# Patient Record
Sex: FEMALE | Race: WHITE | NOT HISPANIC OR LATINO
[De-identification: names, ages, dates, MRNs, and addresses within clinical notes are randomized per-mention and may not be internally consistent; named-entity substitution may affect disease eponyms.]

---

## 2017-09-29 ENCOUNTER — APPOINTMENT (OUTPATIENT)
Dept: DERMATOLOGY | Facility: CLINIC | Age: 47
End: 2017-09-29
Payer: COMMERCIAL

## 2017-09-29 VITALS — DIASTOLIC BLOOD PRESSURE: 70 MMHG | SYSTOLIC BLOOD PRESSURE: 102 MMHG

## 2017-09-29 DIAGNOSIS — L68.9 HYPERTRICHOSIS, UNSPECIFIED: ICD-10-CM

## 2017-09-29 PROCEDURE — 17000 DESTRUCT PREMALG LESION: CPT

## 2017-09-29 PROCEDURE — 99214 OFFICE O/P EST MOD 30 MIN: CPT | Mod: 25

## 2018-10-19 ENCOUNTER — APPOINTMENT (OUTPATIENT)
Dept: DERMATOLOGY | Facility: CLINIC | Age: 48
End: 2018-10-19
Payer: COMMERCIAL

## 2018-10-19 ENCOUNTER — LABORATORY RESULT (OUTPATIENT)
Age: 48
End: 2018-10-19

## 2018-10-19 VITALS — SYSTOLIC BLOOD PRESSURE: 110 MMHG | DIASTOLIC BLOOD PRESSURE: 72 MMHG

## 2018-10-19 DIAGNOSIS — L30.9 DERMATITIS, UNSPECIFIED: ICD-10-CM

## 2018-10-19 DIAGNOSIS — D49.2 NEOPLASM OF UNSPECIFIED BEHAVIOR OF BONE, SOFT TISSUE, AND SKIN: ICD-10-CM

## 2018-10-19 PROCEDURE — 11100 BX SKIN SUBCUTANEOUS&/MUCOUS MEMBRANE 1 LESION: CPT | Mod: 59,GC

## 2018-10-19 PROCEDURE — 17000 DESTRUCT PREMALG LESION: CPT | Mod: GC,59

## 2018-10-19 PROCEDURE — 99214 OFFICE O/P EST MOD 30 MIN: CPT | Mod: 25,GC

## 2018-12-14 ENCOUNTER — LABORATORY RESULT (OUTPATIENT)
Age: 48
End: 2018-12-14

## 2018-12-14 ENCOUNTER — APPOINTMENT (OUTPATIENT)
Dept: DERMATOLOGY | Facility: CLINIC | Age: 48
End: 2018-12-14
Payer: COMMERCIAL

## 2018-12-14 VITALS — SYSTOLIC BLOOD PRESSURE: 100 MMHG | DIASTOLIC BLOOD PRESSURE: 70 MMHG

## 2018-12-14 PROCEDURE — 99213 OFFICE O/P EST LOW 20 MIN: CPT | Mod: 25,GC

## 2018-12-14 PROCEDURE — 17000 DESTRUCT PREMALG LESION: CPT | Mod: GC

## 2018-12-14 PROCEDURE — 11100 BX SKIN SUBCUTANEOUS&/MUCOUS MEMBRANE 1 LESION: CPT | Mod: 59,GC

## 2018-12-20 ENCOUNTER — RESULT REVIEW (OUTPATIENT)
Age: 48
End: 2018-12-20

## 2019-01-29 ENCOUNTER — APPOINTMENT (OUTPATIENT)
Dept: MRI IMAGING | Facility: CLINIC | Age: 49
End: 2019-01-29
Payer: COMMERCIAL

## 2019-01-29 ENCOUNTER — OUTPATIENT (OUTPATIENT)
Dept: OUTPATIENT SERVICES | Facility: HOSPITAL | Age: 49
LOS: 1 days | End: 2019-01-29
Payer: COMMERCIAL

## 2019-01-29 DIAGNOSIS — Z00.8 ENCOUNTER FOR OTHER GENERAL EXAMINATION: ICD-10-CM

## 2019-01-29 PROCEDURE — 74183 MRI ABD W/O CNTR FLWD CNTR: CPT

## 2019-01-29 PROCEDURE — A9585: CPT

## 2019-01-29 PROCEDURE — 74183 MRI ABD W/O CNTR FLWD CNTR: CPT | Mod: 26

## 2019-03-06 ENCOUNTER — APPOINTMENT (OUTPATIENT)
Dept: DERMATOLOGY | Facility: CLINIC | Age: 49
End: 2019-03-06
Payer: COMMERCIAL

## 2019-03-06 PROCEDURE — 99214 OFFICE O/P EST MOD 30 MIN: CPT

## 2019-03-19 ENCOUNTER — APPOINTMENT (OUTPATIENT)
Dept: DERMATOLOGY | Facility: CLINIC | Age: 49
End: 2019-03-19
Payer: COMMERCIAL

## 2019-03-19 DIAGNOSIS — C44.719 BASAL CELL CARCINOMA OF SKIN OF LEFT LOWER LIMB, INCLUDING HIP: ICD-10-CM

## 2019-03-19 PROCEDURE — 17313 MOHS 1 STAGE T/A/L: CPT

## 2019-05-13 ENCOUNTER — APPOINTMENT (OUTPATIENT)
Dept: ULTRASOUND IMAGING | Facility: IMAGING CENTER | Age: 49
End: 2019-05-13

## 2019-05-21 ENCOUNTER — APPOINTMENT (OUTPATIENT)
Dept: DERMATOLOGY | Facility: CLINIC | Age: 49
End: 2019-05-21

## 2019-10-24 ENCOUNTER — RESULT REVIEW (OUTPATIENT)
Age: 49
End: 2019-10-24

## 2019-10-29 ENCOUNTER — APPOINTMENT (OUTPATIENT)
Dept: MAMMOGRAPHY | Facility: CLINIC | Age: 49
End: 2019-10-29
Payer: COMMERCIAL

## 2019-10-29 ENCOUNTER — APPOINTMENT (OUTPATIENT)
Dept: ULTRASOUND IMAGING | Facility: CLINIC | Age: 49
End: 2019-10-29
Payer: COMMERCIAL

## 2019-10-29 ENCOUNTER — OUTPATIENT (OUTPATIENT)
Dept: OUTPATIENT SERVICES | Facility: HOSPITAL | Age: 49
LOS: 1 days | End: 2019-10-29
Payer: COMMERCIAL

## 2019-10-29 DIAGNOSIS — Z00.8 ENCOUNTER FOR OTHER GENERAL EXAMINATION: ICD-10-CM

## 2019-10-29 PROCEDURE — 77067 SCR MAMMO BI INCL CAD: CPT | Mod: 26

## 2019-10-29 PROCEDURE — 76641 ULTRASOUND BREAST COMPLETE: CPT

## 2019-10-29 PROCEDURE — 77063 BREAST TOMOSYNTHESIS BI: CPT

## 2019-10-29 PROCEDURE — 76641 ULTRASOUND BREAST COMPLETE: CPT | Mod: 26,50

## 2019-10-29 PROCEDURE — 77067 SCR MAMMO BI INCL CAD: CPT

## 2019-10-29 PROCEDURE — 77063 BREAST TOMOSYNTHESIS BI: CPT | Mod: 26

## 2019-12-30 ENCOUNTER — APPOINTMENT (OUTPATIENT)
Dept: DERMATOLOGY | Facility: CLINIC | Age: 49
End: 2019-12-30

## 2020-01-23 ENCOUNTER — APPOINTMENT (OUTPATIENT)
Dept: DERMATOLOGY | Facility: CLINIC | Age: 50
End: 2020-01-23
Payer: COMMERCIAL

## 2020-01-23 ENCOUNTER — LABORATORY RESULT (OUTPATIENT)
Age: 50
End: 2020-01-23

## 2020-01-23 PROCEDURE — 11102 TANGNTL BX SKIN SINGLE LES: CPT | Mod: 59

## 2020-01-23 PROCEDURE — 99214 OFFICE O/P EST MOD 30 MIN: CPT | Mod: 25

## 2020-01-23 PROCEDURE — 17003 DESTRUCT PREMALG LES 2-14: CPT | Mod: 59

## 2020-01-23 PROCEDURE — 17000 DESTRUCT PREMALG LESION: CPT | Mod: 59

## 2020-03-04 ENCOUNTER — APPOINTMENT (OUTPATIENT)
Dept: DERMATOLOGY | Facility: CLINIC | Age: 50
End: 2020-03-04
Payer: COMMERCIAL

## 2020-03-04 PROCEDURE — 17312 MOHS ADDL STAGE: CPT

## 2020-03-04 PROCEDURE — 17311 MOHS 1 STAGE H/N/HF/G: CPT

## 2020-03-04 PROCEDURE — 13132 CMPLX RPR F/C/C/M/N/AX/G/H/F: CPT

## 2020-04-25 ENCOUNTER — MESSAGE (OUTPATIENT)
Age: 50
End: 2020-04-25

## 2020-05-05 ENCOUNTER — APPOINTMENT (OUTPATIENT)
Dept: DISASTER EMERGENCY | Facility: HOSPITAL | Age: 50
End: 2020-05-05

## 2020-05-05 LAB
SARS-COV-2 IGG SERPL IA-ACNC: <0.1 INDEX
SARS-COV-2 IGG SERPL QL IA: NEGATIVE

## 2020-10-30 ENCOUNTER — RESULT REVIEW (OUTPATIENT)
Age: 50
End: 2020-10-30

## 2020-11-20 ENCOUNTER — APPOINTMENT (OUTPATIENT)
Dept: DERMATOLOGY | Facility: CLINIC | Age: 50
End: 2020-11-20
Payer: COMMERCIAL

## 2020-11-20 ENCOUNTER — LABORATORY RESULT (OUTPATIENT)
Age: 50
End: 2020-11-20

## 2020-11-20 VITALS — BODY MASS INDEX: 22.08 KG/M2 | HEIGHT: 62 IN | WEIGHT: 120 LBS

## 2020-11-20 DIAGNOSIS — D18.01 HEMANGIOMA OF SKIN AND SUBCUTANEOUS TISSUE: ICD-10-CM

## 2020-11-20 DIAGNOSIS — Z85.828 PERSONAL HISTORY OF OTHER MALIGNANT NEOPLASM OF SKIN: ICD-10-CM

## 2020-11-20 PROCEDURE — 11102 TANGNTL BX SKIN SINGLE LES: CPT | Mod: 59

## 2020-11-20 PROCEDURE — 17000 DESTRUCT PREMALG LESION: CPT | Mod: 59

## 2020-11-20 PROCEDURE — 99213 OFFICE O/P EST LOW 20 MIN: CPT | Mod: 25

## 2020-11-20 PROCEDURE — 17003 DESTRUCT PREMALG LES 2-14: CPT | Mod: 59

## 2020-11-27 ENCOUNTER — NON-APPOINTMENT (OUTPATIENT)
Age: 50
End: 2020-11-27

## 2020-12-11 ENCOUNTER — APPOINTMENT (OUTPATIENT)
Dept: ENDOCRINOLOGY | Facility: CLINIC | Age: 50
End: 2020-12-11
Payer: COMMERCIAL

## 2020-12-11 VITALS
TEMPERATURE: 98.6 F | HEIGHT: 62 IN | RESPIRATION RATE: 16 BRPM | BODY MASS INDEX: 22.82 KG/M2 | HEART RATE: 68 BPM | DIASTOLIC BLOOD PRESSURE: 62 MMHG | OXYGEN SATURATION: 99 % | WEIGHT: 124 LBS | SYSTOLIC BLOOD PRESSURE: 100 MMHG

## 2020-12-11 DIAGNOSIS — R79.89 OTHER SPECIFIED ABNORMAL FINDINGS OF BLOOD CHEMISTRY: ICD-10-CM

## 2020-12-11 PROCEDURE — 36415 COLL VENOUS BLD VENIPUNCTURE: CPT

## 2020-12-11 PROCEDURE — 82962 GLUCOSE BLOOD TEST: CPT

## 2020-12-11 PROCEDURE — 99204 OFFICE O/P NEW MOD 45 MIN: CPT | Mod: 25

## 2020-12-11 PROCEDURE — 99072 ADDL SUPL MATRL&STAF TM PHE: CPT

## 2020-12-11 PROCEDURE — 76536 US EXAM OF HEAD AND NECK: CPT

## 2020-12-11 NOTE — IMPRESSION
[FreeTextEntry1] : MNG with hx of mng with prio fna neg.  She willobtain old recoirds and will plan for f/u US.

## 2020-12-11 NOTE — PROCEDURE
[Readmill e 2008 model, 10-12 MHz frequencies] : multiple real time longitudinal and transverse images were obtained using a high resolution ultrasound with a linear transducer, Readmill e 2008 model, 10-12 MHz frequencies. All measurements will be reported as longitudinal x ninfa-posterior x transverse. [Thyroid Nodule] : thyroid nodule [] : a heterogeneous parenchyma [Mixed] : mixed [No] : does not have a halo [Right Thyroid] : right [Lower] : lower pole there is a  [Isoechoic w/ hypoechoic foci] : isoechoic, with an internal hypoechoic foci nodule [Round] : round in shape [No calcifications] : no calcifications [Peripheral vascularity] : peripheral vascularity [Left Thyroid] : left [Mid] : mid pole there is a  [Solid] : solid [Heterogeneous] : heterogenous nodule [Ovoid] : ovoid in shape [Regular] : regular [Microcalcifications] : microcalcifications [Intense internal vascularity] : intense internal vascularity [FreeTextEntry1] : 3.28 x 1.80 x 1.72 [FreeTextEntry5] : 2.95 x 1.58  x 1.90 [FreeTextEntry2] : 0.47 [FreeTextEntry3] : 1.22 x  0.78 x 0.94

## 2020-12-11 NOTE — PROCEDURE
[[employer] e 2008 model, 10-12 MHz frequencies] : multiple real time longitudinal and transverse images were obtained using a high resolution ultrasound with a linear transducer, [employer] e 2008 model, 10-12 MHz frequencies. All measurements will be reported as longitudinal x ninfa-posterior x transverse. [Thyroid Nodule] : thyroid nodule [] : a heterogeneous parenchyma [Mixed] : mixed [No] : does not have a halo [Right Thyroid] : right [Lower] : lower pole there is a  [Isoechoic w/ hypoechoic foci] : isoechoic, with an internal hypoechoic foci nodule [Round] : round in shape [No calcifications] : no calcifications [Peripheral vascularity] : peripheral vascularity [Left Thyroid] : left [Mid] : mid pole there is a  [Solid] : solid [Heterogeneous] : heterogenous nodule [Ovoid] : ovoid in shape [Regular] : regular [Microcalcifications] : microcalcifications [Intense internal vascularity] : intense internal vascularity [FreeTextEntry1] : 3.28 x 1.80 x 1.72 [FreeTextEntry5] : 2.95 x 1.58  x 1.90 [FreeTextEntry2] : 0.47 [FreeTextEntry3] : 1.22 x  0.78 x 0.94

## 2020-12-15 ENCOUNTER — NON-APPOINTMENT (OUTPATIENT)
Age: 50
End: 2020-12-15

## 2020-12-21 LAB — GLUCOSE BLDC GLUCOMTR-MCNC: 116

## 2020-12-22 DIAGNOSIS — E53.8 DEFICIENCY OF OTHER SPECIFIED B GROUP VITAMINS: ICD-10-CM

## 2020-12-22 LAB
25(OH)D3 SERPL-MCNC: 26 NG/ML
ACTH SER-ACNC: 13.8 PG/ML
ALBUMIN SERPL ELPH-MCNC: 4.6 G/DL
ALP BLD-CCNC: 51 U/L
ALT SERPL-CCNC: 15 U/L
ANION GAP SERPL CALC-SCNC: 10 MMOL/L
AST SERPL-CCNC: 19 U/L
BASOPHILS # BLD AUTO: 0.04 K/UL
BASOPHILS NFR BLD AUTO: 0.9 %
BILIRUB SERPL-MCNC: 0.3 MG/DL
BUN SERPL-MCNC: 11 MG/DL
CALCIT SERPL-MCNC: 7.7 PG/ML
CALCIUM SERPL-MCNC: 9.5 MG/DL
CHLORIDE SERPL-SCNC: 106 MMOL/L
CO2 SERPL-SCNC: 28 MMOL/L
CORTIS SERPL-MCNC: 7.7 UG/DL
CREAT SERPL-MCNC: 0.65 MG/DL
EOSINOPHIL # BLD AUTO: 0.1 K/UL
EOSINOPHIL NFR BLD AUTO: 2.3 %
ESTIMATED AVERAGE GLUCOSE: 100 MG/DL
ESTRADIOL SERPL-MCNC: 66 PG/ML
FSH SERPL-MCNC: 30.9 IU/L
GLUCOSE SERPL-MCNC: 84 MG/DL
HBA1C MFR BLD HPLC: 5.1 %
HCT VFR BLD CALC: 38.7 %
HGB BLD-MCNC: 12.4 G/DL
IGF-1 INTERP: NORMAL
IGF-I BLD-MCNC: 130 NG/ML
IMM GRANULOCYTES NFR BLD AUTO: 0.2 %
INSULIN SERPL-MCNC: 3.4 UU/ML
LH SERPL-ACNC: 34.6 IU/L
LYMPHOCYTES # BLD AUTO: 1.69 K/UL
LYMPHOCYTES NFR BLD AUTO: 38.2 %
MAN DIFF?: NORMAL
MCHC RBC-ENTMCNC: 30.8 PG
MCHC RBC-ENTMCNC: 32 GM/DL
MCV RBC AUTO: 96 FL
MONOCYTES # BLD AUTO: 0.65 K/UL
MONOCYTES NFR BLD AUTO: 14.7 %
NEUTROPHILS # BLD AUTO: 1.93 K/UL
NEUTROPHILS NFR BLD AUTO: 43.7 %
PLATELET # BLD AUTO: 205 K/UL
POTASSIUM SERPL-SCNC: 4.5 MMOL/L
PROGEST SERPL-MCNC: 0.7 NG/ML
PROLACTIN SERPL-MCNC: 11.1 NG/ML
PROT SERPL-MCNC: 6.3 G/DL
RBC # BLD: 4.03 M/UL
RBC # FLD: 11.9 %
SODIUM SERPL-SCNC: 145 MMOL/L
T3FREE SERPL-MCNC: 2.41 PG/ML
T4 FREE SERPL-MCNC: 1.1 NG/DL
THYROGLOB AB SERPL-ACNC: <20 IU/ML
THYROPEROXIDASE AB SERPL IA-ACNC: 18.2 IU/ML
TSH SERPL-ACNC: 1.26 UIU/ML
VIT B12 SERPL-MCNC: 255 PG/ML
WBC # FLD AUTO: 4.42 K/UL

## 2020-12-24 ENCOUNTER — NON-APPOINTMENT (OUTPATIENT)
Age: 50
End: 2020-12-24

## 2020-12-28 ENCOUNTER — NON-APPOINTMENT (OUTPATIENT)
Age: 50
End: 2020-12-28

## 2020-12-28 ENCOUNTER — APPOINTMENT (OUTPATIENT)
Dept: DERMATOLOGY | Facility: CLINIC | Age: 50
End: 2020-12-28
Payer: COMMERCIAL

## 2020-12-28 DIAGNOSIS — C44.712 BASAL CELL CARCINOMA OF SKIN OF RIGHT LOWER LIMB, INCLUDING HIP: ICD-10-CM

## 2020-12-28 PROBLEM — R79.89 HIGH SERUM CALCITONIN: Status: ACTIVE | Noted: 2020-12-22

## 2020-12-28 PROCEDURE — 12041 INTMD RPR N-HF/GENIT 2.5CM/<: CPT

## 2020-12-28 PROCEDURE — 17311 MOHS 1 STAGE H/N/HF/G: CPT

## 2020-12-28 PROCEDURE — 99072 ADDL SUPL MATRL&STAF TM PHE: CPT

## 2020-12-28 NOTE — HISTORY OF PRESENT ILLNESS
[FreeTextEntry1] : Ms. RUSSO  is a 50 year  year old female  who presents for initial endocrine evaluation. She presents via the courtesy of Dr. Yadira Reveles. with regard to a history of low bg noted first during her third pregnancy in 2010/ Post partum she noted significant difficult in dropping weight and continued low bg.  she was evaluated by Dr. 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  She underwent distal pancreatomy with apparent cure. Her surgeon was Dr. Alejandro Menchaca.\par Was well until 2018 when she again was gaining weight and noted low bg with exercise.  Full w/u carried out with thoughts that a recurrence may be present but may possibly have occurred outside of the pancreas. She had 2nd opinion at [facility] and ultimately had a surgical procedure that did not involve pancreatic surgery. ? removal of a node with ultimate resolution of her symptoms.\par \par She did have PEt scan showing gianluca of mng lighting up in thyroid regions.  Had fna 2019 ? biilat-she is unsure with apparent benign findings.  She too stated calcitonin and tft's were normal and MEN gene testing was said to be negative.\par . Additional medical history includes that of  .\par

## 2020-12-28 NOTE — HISTORY OF PRESENT ILLNESS
[FreeTextEntry1] : Ms. RUSSO  is a 50 year  year old female  who presents for initial endocrine evaluation. She presents via the courtesy of Dr. Yadira Reveles. with regard to a history of low bg noted first during her third pregnancy in 2010/ Post partum she noted significant difficult in dropping weight and continued low bg.  she was evaluated by Dr. 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  She underwent distal pancreatomy with apparent cure. Her surgeon was Dr. Alejandro Menchaca.\par Was well until 2018 when she again was gaining weight and noted low bg with exercise.  Full w/u carried out with thoughts that a recurrence may be present but may possibly have occurred outside of the pancreas. She had 2nd opinion at [facility] and ultimately had a surgical procedure that did not involve pancreatic surgery. ? removal of a node with ultimate resolution of her symptoms.\par \par She did have PEt scan showing gianluca of mng lighting up in thyroid regions.  Had fna 2019 ? biilat-she is unsure with apparent benign findings.  She too stated calcitonin and tft's were normal and MEN gene testing was said to be negative.\par . Additional medical history includes that of  .\par

## 2021-01-04 LAB
CORTICOSTEROID BIND GLOBULIN: 1.8 MG/DL
CORTIS SERPL-MCNC: 8.2 UG/DL
CORTISOL, FREE: 0.58 UG/DL
PFCX: 7.1 %

## 2021-01-06 ENCOUNTER — TRANSCRIPTION ENCOUNTER (OUTPATIENT)
Age: 51
End: 2021-01-06

## 2021-01-15 ENCOUNTER — APPOINTMENT (OUTPATIENT)
Dept: MAMMOGRAPHY | Facility: CLINIC | Age: 51
End: 2021-01-15
Payer: COMMERCIAL

## 2021-01-15 ENCOUNTER — APPOINTMENT (OUTPATIENT)
Dept: ULTRASOUND IMAGING | Facility: CLINIC | Age: 51
End: 2021-01-15
Payer: COMMERCIAL

## 2021-01-15 ENCOUNTER — OUTPATIENT (OUTPATIENT)
Dept: OUTPATIENT SERVICES | Facility: HOSPITAL | Age: 51
LOS: 1 days | End: 2021-01-15
Payer: COMMERCIAL

## 2021-01-15 DIAGNOSIS — Z00.8 ENCOUNTER FOR OTHER GENERAL EXAMINATION: ICD-10-CM

## 2021-01-15 DIAGNOSIS — R92.2 INCONCLUSIVE MAMMOGRAM: ICD-10-CM

## 2021-01-15 DIAGNOSIS — Z12.31 ENCOUNTER FOR SCREENING MAMMOGRAM FOR MALIGNANT NEOPLASM OF BREAST: ICD-10-CM

## 2021-01-15 PROCEDURE — 76641 ULTRASOUND BREAST COMPLETE: CPT | Mod: 26,50

## 2021-01-15 PROCEDURE — 77067 SCR MAMMO BI INCL CAD: CPT | Mod: 26

## 2021-01-15 PROCEDURE — 76641 ULTRASOUND BREAST COMPLETE: CPT

## 2021-01-15 PROCEDURE — 77067 SCR MAMMO BI INCL CAD: CPT

## 2021-01-15 PROCEDURE — 77063 BREAST TOMOSYNTHESIS BI: CPT | Mod: 26

## 2021-01-15 PROCEDURE — 77063 BREAST TOMOSYNTHESIS BI: CPT

## 2021-01-28 ENCOUNTER — APPOINTMENT (OUTPATIENT)
Dept: INTERNAL MEDICINE | Facility: CLINIC | Age: 51
End: 2021-01-28
Payer: COMMERCIAL

## 2021-01-28 VITALS
DIASTOLIC BLOOD PRESSURE: 78 MMHG | BODY MASS INDEX: 21.53 KG/M2 | TEMPERATURE: 98.4 F | HEART RATE: 70 BPM | HEIGHT: 62 IN | OXYGEN SATURATION: 97 % | WEIGHT: 117 LBS | SYSTOLIC BLOOD PRESSURE: 113 MMHG

## 2021-01-28 DIAGNOSIS — M77.9 ENTHESOPATHY, UNSPECIFIED: ICD-10-CM

## 2021-01-28 DIAGNOSIS — Z00.00 ENCOUNTER FOR GENERAL ADULT MEDICAL EXAMINATION W/OUT ABNORMAL FINDINGS: ICD-10-CM

## 2021-01-28 PROCEDURE — 99386 PREV VISIT NEW AGE 40-64: CPT

## 2021-01-28 PROCEDURE — 99072 ADDL SUPL MATRL&STAF TM PHE: CPT

## 2021-01-28 NOTE — PLAN
[FreeTextEntry1] : HCM\par -recent labs reviewed\par -flu shot 10/20\par -completed 2 doses of COVID vaccine, tolerated well\par -mammogram 1/21\par -pap smear UTD\par -will schedule GI visit with Dr Chao for screening colonoscopy\par \par Thyroid nodules, h/o insulinoma\par -repeat calcitonin level to be checked tomorrow\par -add on CEA, endocrine from [place] request\par -continue care per endo\par \par Wrist pain likely tendonitis- ice, NSAIDs as needed, discussed seeing sports for possible steroid if worsens/no improvement

## 2021-01-28 NOTE — HEALTH RISK ASSESSMENT
[Yes] : Yes [No] : In the past 12 months have you used drugs other than those required for medical reasons? No [Patient reported mammogram was normal] : Patient reported mammogram was normal [] : No [MammogramDate] : 1/21

## 2021-01-28 NOTE — PHYSICAL EXAM
[No Acute Distress] : no acute distress [Well Nourished] : well nourished [Well Developed] : well developed [Normal Sclera/Conjunctiva] : normal sclera/conjunctiva [PERRL] : pupils equal round and reactive to light [Normal Outer Ear/Nose] : the outer ears and nose were normal in appearance [No Respiratory Distress] : no respiratory distress  [No Accessory Muscle Use] : no accessory muscle use [Clear to Auscultation] : lungs were clear to auscultation bilaterally [Normal Rate] : normal rate  [Regular Rhythm] : with a regular rhythm [Normal S1, S2] : normal S1 and S2 [No Edema] : there was no peripheral edema [Soft] : abdomen soft [Non Tender] : non-tender [Non-distended] : non-distended [Normal Bowel Sounds] : normal bowel sounds [No CVA Tenderness] : no CVA  tenderness [Grossly Normal Strength/Tone] : grossly normal strength/tone [Coordination Grossly Intact] : coordination grossly intact [No Focal Deficits] : no focal deficits [Normal Gait] : normal gait [Normal Affect] : the affect was normal [Alert and Oriented x3] : oriented to person, place, and time [Normal Insight/Judgement] : insight and judgment were intact [de-identified] : incision scar noted

## 2021-01-28 NOTE — HISTORY OF PRESENT ILLNESS
[FreeTextEntry1] : establish care [de-identified] : 51yo F with PMH of insulinoma and thyroid nodules presents to establish care.\par In 2010 during pregnancy she was found to have hypoglycemia, had central pancreatectomy and was stable after then. Then in January 2019 she became symptomatic again, was getting hypoglycemic so she went back to endocrinologist. She had an endoscopic US with subsequent lymph node dissection but did not require further surgery on pancreas. During that workup her thyroid showed up on PET scan, she then had thyroid biopsy which was benign and advised annual follow up. She recently established with Dr Roberts in December, had another thyroid US done at [place] and advised that nodules were similar. Her calcitonin level was found to be slightly elevated so she has repeat test for tomorrow. \par She is otherwise well without acute concerns. Notes pain in her right wrist with a twisting motion, never injured it that she can think of. Also has occasional left sided hip pain, no trauma to the area and no radiation of pain.

## 2021-01-30 LAB — CEA SERPL-MCNC: 1.1 NG/ML

## 2021-02-05 ENCOUNTER — NON-APPOINTMENT (OUTPATIENT)
Age: 51
End: 2021-02-05

## 2021-02-05 LAB
AMYLASE/CREAT SERPL: 56 U/L
CALCIT SERPL-MCNC: 4.4 PG/ML
LPL SERPL-CCNC: 24 U/L

## 2021-02-07 ENCOUNTER — TRANSCRIPTION ENCOUNTER (OUTPATIENT)
Age: 51
End: 2021-02-07

## 2021-02-09 ENCOUNTER — NON-APPOINTMENT (OUTPATIENT)
Age: 51
End: 2021-02-09

## 2021-04-06 ENCOUNTER — APPOINTMENT (OUTPATIENT)
Dept: ENDOCRINOLOGY | Facility: CLINIC | Age: 51
End: 2021-04-06
Payer: COMMERCIAL

## 2021-04-06 VITALS
WEIGHT: 122 LBS | HEART RATE: 66 BPM | DIASTOLIC BLOOD PRESSURE: 80 MMHG | OXYGEN SATURATION: 99 % | BODY MASS INDEX: 22.31 KG/M2 | RESPIRATION RATE: 16 BRPM | SYSTOLIC BLOOD PRESSURE: 118 MMHG

## 2021-04-06 DIAGNOSIS — R79.89 OTHER SPECIFIED ABNORMAL FINDINGS OF BLOOD CHEMISTRY: ICD-10-CM

## 2021-04-06 DIAGNOSIS — E04.1 NONTOXIC SINGLE THYROID NODULE: ICD-10-CM

## 2021-04-06 DIAGNOSIS — E55.9 VITAMIN D DEFICIENCY, UNSPECIFIED: ICD-10-CM

## 2021-04-06 DIAGNOSIS — E53.8 DEFICIENCY OF OTHER SPECIFIED B GROUP VITAMINS: ICD-10-CM

## 2021-04-06 PROCEDURE — 99214 OFFICE O/P EST MOD 30 MIN: CPT

## 2021-04-06 PROCEDURE — 99072 ADDL SUPL MATRL&STAF TM PHE: CPT

## 2021-04-22 PROBLEM — R79.89 LOW VITAMIN D LEVEL: Status: ACTIVE | Noted: 2020-12-22

## 2021-04-22 PROBLEM — E04.1 NODULAR THYROID DISEASE: Status: ACTIVE | Noted: 2020-12-11

## 2021-04-22 NOTE — HISTORY OF PRESENT ILLNESS
[FreeTextEntry1] : Ms. RUSSO  is a 50 year  old female  who presents for f/u eval.   .Too, recent US from [place] was said ot show nodularity that was  similar in size to the nodule from 2019 at which time  fna-showed benign findings. US here from 12/11/2020 showed mng with two sub cm nodules and a 1.22 cm left mid pole non high risk nodule noted. Is on d3 50,000 IU weekly and b-12 sc qod. VIT C.\par Ran out of her D3 about 3 weeks ago.\par  Se too has hx of  low bg noted first during her third pregnancy in 2010/ Post partum she noted significant difficult in dropping weight and continued low bg.  she was evaluated by Dr. 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  She underwent distal pancreatomy with apparent cure. Her surgeon was Dr. Alejandro Menchaca.\par Was well until 2018 when she again was gaining weight and noted low bg with exercise.  Full w/u carried out with thoughts that a recurrence may be present but may possibly have occurred outside of the pancreas. She had 2nd opinion at [facility] and ultimately had a surgical procedure that did not involve pancreatic surgery. ? removal of a node with ultimate resolution of her symptoms.\par \par She did have PEt scan showing gianluca of mng lighting up in thyroid regions.  Had fna 2019 ? biiat-she is unsure with apparent benign findings.  She too stated calcitonin and tft's were normal and MEN gene testing was said to be negative.\par \par Our lab testing in Dec/Jose was normal with an initial slightly elevated calcitonin and repeat wnl.\par No recent low bg episodes.No recent weight changes.\par

## 2021-04-22 NOTE — PHYSICAL EXAM
[Alert] : alert [Well Nourished] : well nourished [No Acute Distress] : no acute distress [Well Developed] : well developed [Normal Sclera/Conjunctiva] : normal sclera/conjunctiva [EOMI] : extra ocular movement intact [No Proptosis] : no proptosis [Normal Oropharynx] : the oropharynx was normal [Thyroid Not Enlarged] : the thyroid was not enlarged [No Thyroid Nodules] : no palpable thyroid nodules [No Respiratory Distress] : no respiratory distress [No Accessory Muscle Use] : no accessory muscle use [Clear to Auscultation] : lungs were clear to auscultation bilaterally [Normal S1, S2] : normal S1 and S2 [Normal Rate] : heart rate was normal [Regular Rhythm] : with a regular rhythm [No Edema] : no peripheral edema [Pedal Pulses Normal] : the pedal pulses are present [Normal Bowel Sounds] : normal bowel sounds [Not Tender] : non-tender [Not Distended] : not distended [Soft] : abdomen soft [Normal Posterior Cervical Nodes] : no posterior cervical lymphadenopathy [Normal Anterior Cervical Nodes] : no anterior cervical lymphadenopathy [No Spinal Tenderness] : no spinal tenderness [Spine Straight] : spine straight [No Stigmata of Cushings Syndrome] : no stigmata of Cushings Syndrome [Normal Gait] : normal gait [Normal Strength/Tone] : muscle strength and tone were normal [No Rash] : no rash [Acanthosis Nigricans] : no acanthosis nigricans [Normal Reflexes] : deep tendon reflexes were 2+ and symmetric [No Tremors] : no tremors [Oriented x3] : oriented to person, place, and time

## 2021-05-12 ENCOUNTER — APPOINTMENT (OUTPATIENT)
Dept: DERMATOLOGY | Facility: CLINIC | Age: 51
End: 2021-05-12
Payer: COMMERCIAL

## 2021-05-12 PROCEDURE — 99213 OFFICE O/P EST LOW 20 MIN: CPT | Mod: 25

## 2021-05-12 PROCEDURE — 99072 ADDL SUPL MATRL&STAF TM PHE: CPT

## 2021-05-12 PROCEDURE — 17000 DESTRUCT PREMALG LESION: CPT

## 2021-06-10 LAB
25(OH)D3 SERPL-MCNC: 72.4 NG/ML
ALBUMIN SERPL ELPH-MCNC: 4.8 G/DL
ALP BLD-CCNC: 54 U/L
ALT SERPL-CCNC: 9 U/L
ANION GAP SERPL CALC-SCNC: 19 MMOL/L
AST SERPL-CCNC: 18 U/L
BILIRUB SERPL-MCNC: 0.4 MG/DL
BUN SERPL-MCNC: 9 MG/DL
CALCIUM SERPL-MCNC: 9.9 MG/DL
CHLORIDE SERPL-SCNC: 98 MMOL/L
CHOLEST SERPL-MCNC: 217 MG/DL
CO2 SERPL-SCNC: 20 MMOL/L
CREAT SERPL-MCNC: 0.59 MG/DL
ESTIMATED AVERAGE GLUCOSE: 105 MG/DL
FERRITIN SERPL-MCNC: 132 NG/ML
GLUCOSE SERPL-MCNC: 100 MG/DL
HBA1C MFR BLD HPLC: 5.3 %
HDLC SERPL-MCNC: 88 MG/DL
IRON SATN MFR SERPL: 16 %
IRON SERPL-MCNC: 41 UG/DL
LDLC SERPL CALC-MCNC: 111 MG/DL
NONHDLC SERPL-MCNC: 129 MG/DL
POTASSIUM SERPL-SCNC: 4.1 MMOL/L
PROT SERPL-MCNC: 7.2 G/DL
SODIUM SERPL-SCNC: 137 MMOL/L
T3FREE SERPL-MCNC: 3.1 PG/ML
T4 FREE SERPL-MCNC: 1.6 NG/DL
THYROGLOB AB SERPL-ACNC: <20 IU/ML
THYROPEROXIDASE AB SERPL IA-ACNC: <10 IU/ML
TIBC SERPL-MCNC: 258 UG/DL
TRIGL SERPL-MCNC: 89 MG/DL
TSH SERPL-ACNC: 0.72 UIU/ML
UIBC SERPL-MCNC: 217 UG/DL
VIT B12 SERPL-MCNC: 923 PG/ML

## 2021-06-10 RX ORDER — CHOLECALCIFEROL (VITAMIN D3) 1250 MCG
1.25 MG CAPSULE ORAL
Refills: 0 | Status: ACTIVE | COMMUNITY
Start: 2020-12-22

## 2021-06-15 ENCOUNTER — NON-APPOINTMENT (OUTPATIENT)
Age: 51
End: 2021-06-15

## 2021-06-15 ENCOUNTER — APPOINTMENT (OUTPATIENT)
Dept: INTERNAL MEDICINE | Facility: CLINIC | Age: 51
End: 2021-06-15
Payer: COMMERCIAL

## 2021-06-15 VITALS
BODY MASS INDEX: 20.24 KG/M2 | WEIGHT: 110 LBS | DIASTOLIC BLOOD PRESSURE: 80 MMHG | HEART RATE: 86 BPM | SYSTOLIC BLOOD PRESSURE: 134 MMHG | TEMPERATURE: 98 F | OXYGEN SATURATION: 98 % | HEIGHT: 62 IN

## 2021-06-15 DIAGNOSIS — J34.89 OTHER SPECIFIED DISORDERS OF NOSE AND NASAL SINUSES: ICD-10-CM

## 2021-06-15 PROCEDURE — 99072 ADDL SUPL MATRL&STAF TM PHE: CPT

## 2021-06-15 PROCEDURE — 99214 OFFICE O/P EST MOD 30 MIN: CPT

## 2021-06-15 NOTE — HISTORY OF PRESENT ILLNESS
[FreeTextEntry8] : Patient feels unwell since Saturday. She has been experiencing headaches/sinus pressure, chills. She has clear nasal discharge, irritated throat. Denies PND, cough, fevers, CP, SOB. She is fully vaccinated against COVID. \par \par She also complains of significant anxiety over the past month. She has been going through personal struggles in her marriage which is precipitating the anxiety. She notes feels on edge and will react to situations differently than usual. She denies feeling depressed or hopeless. She gets episodes where she feels very overwhelmed. She has been going to therapy once every 2 weeks.

## 2021-06-15 NOTE — REVIEW OF SYSTEMS
[Earache] : no earache [Sore Throat] : no sore throat [Postnasal Drip] : no postnasal drip [Anxiety] : anxiety [Negative] : Neurological [FreeTextEntry4] : sinus pressure

## 2021-06-15 NOTE — PLAN
[FreeTextEntry1] : Sinus pressure- no fever, no yellow/green discharge\par -viral vs allergies\par -trial of antihistamine, flonase, steam inhalation\par -COVID test to rule out although vaccinated\par \par \par Anxiety\par -will start escitalopram 5mg daily, can increase to 10mg daily after 2 weeks\par -xanax PRN for anxiety attack- istop # [id number]\par -continue biweekly therapy sessions\par -listening to podcasts which are helpful\par -f/u in 1 month, call if any acute changes

## 2021-06-15 NOTE — PHYSICAL EXAM
[No Acute Distress] : no acute distress [Normal Outer Ear/Nose] : the outer ears and nose were normal in appearance [Normal Oropharynx] : the oropharynx was normal [No Respiratory Distress] : no respiratory distress  [No Accessory Muscle Use] : no accessory muscle use [Clear to Auscultation] : lungs were clear to auscultation bilaterally [Normal Rate] : normal rate  [Regular Rhythm] : with a regular rhythm [Normal S1, S2] : normal S1 and S2 [No Focal Deficits] : no focal deficits [Alert and Oriented x3] : oriented to person, place, and time [de-identified] : tearful during encounter when discussing anxiety and personal issues [de-identified] : sinus pressure

## 2021-06-16 LAB — SARS-COV-2 N GENE NPH QL NAA+PROBE: NOT DETECTED

## 2021-07-08 ENCOUNTER — RX CHANGE (OUTPATIENT)
Age: 51
End: 2021-07-08

## 2021-07-09 ENCOUNTER — APPOINTMENT (OUTPATIENT)
Dept: INTERNAL MEDICINE | Facility: CLINIC | Age: 51
End: 2021-07-09
Payer: COMMERCIAL

## 2021-07-09 VITALS
TEMPERATURE: 98.1 F | OXYGEN SATURATION: 99 % | DIASTOLIC BLOOD PRESSURE: 76 MMHG | BODY MASS INDEX: 20.24 KG/M2 | WEIGHT: 110 LBS | SYSTOLIC BLOOD PRESSURE: 128 MMHG | HEART RATE: 84 BPM | HEIGHT: 62 IN

## 2021-07-09 PROCEDURE — 99072 ADDL SUPL MATRL&STAF TM PHE: CPT

## 2021-07-09 PROCEDURE — 99213 OFFICE O/P EST LOW 20 MIN: CPT

## 2021-07-09 NOTE — PLAN
[FreeTextEntry1] : Continue lexapro 5mg daily\par Refill xanax for PRN use, istop ref # [id number] \par Continue therapy sessions\par Follow up in 1 month

## 2021-07-09 NOTE — HISTORY OF PRESENT ILLNESS
[de-identified] : Patient here for follow up regarding anxiety.\par At last visit she was started on lexapro 5mg which she feels has made a difference. She had significant improvement in her anxiety, over the past week though she woke up a few nights feeling anxious. Those times she used xanax to calm down but has not used otherwise. She is trying to focus on keeping busy, continues with therapy sessions.

## 2021-07-12 ENCOUNTER — RX CHANGE (OUTPATIENT)
Age: 51
End: 2021-07-12

## 2021-07-12 RX ORDER — ESCITALOPRAM OXALATE 5 MG/1
5 TABLET ORAL
Qty: 30 | Refills: 1 | Status: DISCONTINUED | COMMUNITY
Start: 2021-06-15 | End: 2021-07-12

## 2021-08-18 ENCOUNTER — TRANSCRIPTION ENCOUNTER (OUTPATIENT)
Age: 51
End: 2021-08-18

## 2021-08-24 ENCOUNTER — APPOINTMENT (OUTPATIENT)
Dept: INTERNAL MEDICINE | Facility: CLINIC | Age: 51
End: 2021-08-24
Payer: COMMERCIAL

## 2021-08-24 VITALS
HEART RATE: 76 BPM | WEIGHT: 110 LBS | HEIGHT: 62 IN | TEMPERATURE: 98.2 F | DIASTOLIC BLOOD PRESSURE: 76 MMHG | OXYGEN SATURATION: 97 % | SYSTOLIC BLOOD PRESSURE: 112 MMHG | BODY MASS INDEX: 20.24 KG/M2

## 2021-08-24 PROCEDURE — 99213 OFFICE O/P EST LOW 20 MIN: CPT

## 2021-08-24 NOTE — HISTORY OF PRESENT ILLNESS
[de-identified] : Patient here for follow up visit.\par She continues to take lexapro 5mg daily, doing well. Her anxiety during the day has definitely improved.\par About once per week she is waking up in the middle of the night with anxiety.

## 2021-08-24 NOTE — PHYSICAL EXAM
[No Acute Distress] : no acute distress [Well Nourished] : well nourished [No Respiratory Distress] : no respiratory distress  [No Accessory Muscle Use] : no accessory muscle use [Clear to Auscultation] : lungs were clear to auscultation bilaterally [Normal Rate] : normal rate  [Regular Rhythm] : with a regular rhythm [Normal S1, S2] : normal S1 and S2 [Alert and Oriented x3] : oriented to person, place, and time [No Focal Deficits] : no focal deficits

## 2021-10-08 ENCOUNTER — RX RENEWAL (OUTPATIENT)
Age: 51
End: 2021-10-08

## 2021-10-11 ENCOUNTER — APPOINTMENT (OUTPATIENT)
Dept: PSYCHIATRY | Facility: CLINIC | Age: 51
End: 2021-10-11
Payer: COMMERCIAL

## 2021-10-11 PROCEDURE — 90791 PSYCH DIAGNOSTIC EVALUATION: CPT

## 2021-10-18 ENCOUNTER — APPOINTMENT (OUTPATIENT)
Dept: INTERNAL MEDICINE | Facility: CLINIC | Age: 51
End: 2021-10-18

## 2021-10-18 ENCOUNTER — APPOINTMENT (OUTPATIENT)
Dept: PSYCHIATRY | Facility: CLINIC | Age: 51
End: 2021-10-18
Payer: COMMERCIAL

## 2021-10-18 PROCEDURE — 90834 PSYTX W PT 45 MINUTES: CPT

## 2021-10-26 ENCOUNTER — APPOINTMENT (OUTPATIENT)
Dept: PSYCHIATRY | Facility: CLINIC | Age: 51
End: 2021-10-26
Payer: COMMERCIAL

## 2021-10-26 PROCEDURE — 90834 PSYTX W PT 45 MINUTES: CPT

## 2021-11-05 ENCOUNTER — APPOINTMENT (OUTPATIENT)
Dept: PSYCHIATRY | Facility: CLINIC | Age: 51
End: 2021-11-05
Payer: COMMERCIAL

## 2021-11-05 PROCEDURE — 90834 PSYTX W PT 45 MINUTES: CPT

## 2021-11-09 ENCOUNTER — APPOINTMENT (OUTPATIENT)
Dept: PSYCHIATRY | Facility: CLINIC | Age: 51
End: 2021-11-09
Payer: COMMERCIAL

## 2021-11-09 PROCEDURE — 90837 PSYTX W PT 60 MINUTES: CPT

## 2021-11-18 ENCOUNTER — APPOINTMENT (OUTPATIENT)
Dept: PSYCHIATRY | Facility: CLINIC | Age: 51
End: 2021-11-18
Payer: COMMERCIAL

## 2021-11-18 PROCEDURE — 90834 PSYTX W PT 45 MINUTES: CPT

## 2021-11-24 ENCOUNTER — APPOINTMENT (OUTPATIENT)
Dept: PSYCHIATRY | Facility: CLINIC | Age: 51
End: 2021-11-24
Payer: COMMERCIAL

## 2021-11-24 PROCEDURE — 90837 PSYTX W PT 60 MINUTES: CPT

## 2021-12-03 ENCOUNTER — APPOINTMENT (OUTPATIENT)
Dept: PSYCHIATRY | Facility: CLINIC | Age: 51
End: 2021-12-03
Payer: COMMERCIAL

## 2021-12-03 PROCEDURE — 90837 PSYTX W PT 60 MINUTES: CPT

## 2021-12-10 ENCOUNTER — RESULT REVIEW (OUTPATIENT)
Age: 51
End: 2021-12-10

## 2021-12-14 ENCOUNTER — NON-APPOINTMENT (OUTPATIENT)
Age: 51
End: 2021-12-14

## 2021-12-15 ENCOUNTER — LABORATORY RESULT (OUTPATIENT)
Age: 51
End: 2021-12-15

## 2021-12-15 ENCOUNTER — APPOINTMENT (OUTPATIENT)
Dept: DERMATOLOGY | Facility: CLINIC | Age: 51
End: 2021-12-15
Payer: COMMERCIAL

## 2021-12-15 DIAGNOSIS — D22.9 MELANOCYTIC NEVI, UNSPECIFIED: ICD-10-CM

## 2021-12-15 DIAGNOSIS — D48.5 NEOPLASM OF UNCERTAIN BEHAVIOR OF SKIN: ICD-10-CM

## 2021-12-15 DIAGNOSIS — Z85.828 PERSONAL HISTORY OF OTHER MALIGNANT NEOPLASM OF SKIN: ICD-10-CM

## 2021-12-15 PROCEDURE — 11102 TANGNTL BX SKIN SINGLE LES: CPT | Mod: 59

## 2021-12-15 PROCEDURE — 17000 DESTRUCT PREMALG LESION: CPT | Mod: 59

## 2021-12-15 PROCEDURE — 99213 OFFICE O/P EST LOW 20 MIN: CPT | Mod: 25

## 2021-12-15 NOTE — PHYSICAL EXAM
[Alert] : alert [Oriented x 3] : ~L oriented x 3 [Well Nourished] : well nourished [Conjunctiva Non-injected] : conjunctiva non-injected [No Visual Lymphadenopathy] : no visual  lymphadenopathy [No Clubbing] : no clubbing [No Edema] : no edema [No Bromhidrosis] : no bromhidrosis [No Chromhidrosis] : no chromhidrosis [Full Body Skin Exam Performed] : performed [FreeTextEntry3] : General: Alert and oriented, in NAD. \par All of the following were examined and were within normal limits, except as noted:  \par Scalp:\par Face, including eyelids, nose, lips, ears, oropharynx:\par Neck:\par Chest/Back/Abdomen:\par Bilateral Arms/Hands:\par Bilateral Legs/Feet:\par Buttocks, Genitalia, Anus/perineum:  	\par Hair, Nails, Oral Mucosa, Eyes:\par Unable to examine nails due to polish\par - WHSS on L lower leg, L jaw, upper lip, R ankle without recurrence\par - pink gritty patch on R upper forehead\par - lentigines\par - multiple homogenous brown macules distributed throughout trunk and extremities \par pink pearly plaque on L lower leg (L shin)

## 2021-12-15 NOTE — HISTORY OF PRESENT ILLNESS
[FreeTextEntry1] : fu: spot on leg [de-identified] : Ms. CHARLEY RUSSO is a 51 year old F here for FUV of below: LV FBSE in May 2021\par \par Problem: red spot\par Location: left lower leg\par Duration: unknown\par Associated symptoms/Aggravating Factors: denies\par Modifying factors/Treatments: denies\par  \par FBSE\par No other changing or concerning lesions. \par No itchy, growing, bleeding, painful, or changing moles. \par \par Past Derm History: \par - BCC upper lip (~2010 ), s/p Mohs outside practice\par - BCC left shin s/p Mohs March 2019 \par - BCC L jawline 3/2020 s/p Mohs w/ Dr. 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

## 2021-12-15 NOTE — ASSESSMENT
[FreeTextEntry1] : # NUB of skin, left lower leg\par Diff dx: R/O BCC \par Tangential Shave biopsy performed\par Will call patient with biopsy results \par SHAVE BIOPSY PROCEDURE NOTE\par Alternatives to this procedure, benefits of, and risks including bleeding, scarring, and infection were explained to the patient. Informed consent was documented and a consent form was signed by the patient and surgeon. Each lesion was cleaned with hibiclens and anesthetized with 1% lidocaine with epinephrine. Each shave biopsy was performed using a Dermablade requiring no closure. Hemostasis was established with aluminum chloride solution. Each specimen was sent to pathology. Vaseline and a bandage were applied to each site. The patient tolerated the procedure well with minimal blood loss and no complications. Post-op instructions were given. There were no pre-op or post-op medications for this procedure. The patient was instructed to contact us if persistent bleeding, increasing pain, swelling, or redness occurs. Patient's phone number was obtained for reporting of results. Patient gave verbal permission to leave a voice message with results, if they were unable to answer their telephone. The patient was instructed to keep the biopsy site dry and covered for 24 hours. After that, they may wash the area gently daily with mild soap and water and apply a clean vaseline and a bandage until it has healed.  \par  \par # Actinic Keratosis x1, R forehead \par - We have discussed the nature and usual course of these lesions. \par - AK to forehead \par - The patient was informed of the pathophysiology of their lesions and their treatment course with liquid nitrogen (cryosurgery). Side effects include blister formation, hypopigmentation, and scarring. Patient was verbally consented and the lesions identified above were treated with liquid nitrogen freeze, thaw, freeze x 10 seconds each cycle x 2. The patient tolerated the procedure well.  Wound care instructions, care of a blister with vaseline, signs and symptoms of infection were discussed in full.  The patient denies any questions at this time.\par \par # Hx of Multiple BCCs (upper lip, L shin, L jawline, R ankle)\par - No evidence of recurrence\par - Sun protective measures reinforced\par - Recommend full body skin exam atleast annually\par - Continue with niacinamide 500mg BID  to reduce risk of NMSCs\par \par # Solar lentigines\par - Discussed etiology and benign nature of condition\par - Sun protective measures reinforced\par \par # Multiple melanocytic nevi, benign \par - Monitor moles for the following changes: \par A: asymmetry\par B: border changes\par C: color changes (Darker, Multicolored, Whiter)\par D: diameter greater than a pencil eraser size \par E: evolving, or changing mole \par - Recommend wearing hats and sun protective clothing or OTC sunscreen products (SPF 30+, broad band, EltaMD/La Roche Posay/Neutrogena) daily on your face and entire body (apply sunscreen atleast 30 minutes prior to going outside). Reapply sunscreen every 2 hours when outside.\par \par # Skin cancer screening\par - Discussed sun protective measures, including the daily use and reapplication of broad-spectrum sunscreen with SPF 30+ and the use of sun protective clothing. \par \par Will call patient with biopsy results

## 2021-12-16 ENCOUNTER — APPOINTMENT (OUTPATIENT)
Dept: PSYCHIATRY | Facility: CLINIC | Age: 51
End: 2021-12-16
Payer: COMMERCIAL

## 2021-12-16 PROCEDURE — 90837 PSYTX W PT 60 MINUTES: CPT

## 2021-12-21 ENCOUNTER — APPOINTMENT (OUTPATIENT)
Dept: PSYCHIATRY | Facility: CLINIC | Age: 51
End: 2021-12-21
Payer: COMMERCIAL

## 2021-12-21 PROCEDURE — 90837 PSYTX W PT 60 MINUTES: CPT

## 2021-12-23 ENCOUNTER — APPOINTMENT (OUTPATIENT)
Dept: PSYCHIATRY | Facility: CLINIC | Age: 51
End: 2021-12-23
Payer: COMMERCIAL

## 2021-12-23 PROCEDURE — 90837 PSYTX W PT 60 MINUTES: CPT

## 2021-12-27 ENCOUNTER — TRANSCRIPTION ENCOUNTER (OUTPATIENT)
Age: 51
End: 2021-12-27

## 2021-12-27 ENCOUNTER — NON-APPOINTMENT (OUTPATIENT)
Age: 51
End: 2021-12-27

## 2021-12-29 ENCOUNTER — APPOINTMENT (OUTPATIENT)
Dept: PSYCHIATRY | Facility: CLINIC | Age: 51
End: 2021-12-29
Payer: COMMERCIAL

## 2021-12-29 PROCEDURE — 90837 PSYTX W PT 60 MINUTES: CPT | Mod: 95

## 2022-01-03 ENCOUNTER — APPOINTMENT (OUTPATIENT)
Dept: PSYCHIATRY | Facility: CLINIC | Age: 52
End: 2022-01-03
Payer: COMMERCIAL

## 2022-01-03 PROCEDURE — 90834 PSYTX W PT 45 MINUTES: CPT | Mod: 95

## 2022-01-07 ENCOUNTER — APPOINTMENT (OUTPATIENT)
Dept: PSYCHIATRY | Facility: CLINIC | Age: 52
End: 2022-01-07
Payer: COMMERCIAL

## 2022-01-07 PROCEDURE — 90834 PSYTX W PT 45 MINUTES: CPT

## 2022-01-14 ENCOUNTER — APPOINTMENT (OUTPATIENT)
Dept: PSYCHIATRY | Facility: CLINIC | Age: 52
End: 2022-01-14
Payer: COMMERCIAL

## 2022-01-14 PROCEDURE — 90834 PSYTX W PT 45 MINUTES: CPT

## 2022-01-21 ENCOUNTER — APPOINTMENT (OUTPATIENT)
Dept: PSYCHIATRY | Facility: CLINIC | Age: 52
End: 2022-01-21
Payer: COMMERCIAL

## 2022-01-21 PROCEDURE — 90834 PSYTX W PT 45 MINUTES: CPT | Mod: 95

## 2022-01-25 ENCOUNTER — APPOINTMENT (OUTPATIENT)
Dept: PSYCHIATRY | Facility: CLINIC | Age: 52
End: 2022-01-25
Payer: COMMERCIAL

## 2022-01-25 PROCEDURE — 90834 PSYTX W PT 45 MINUTES: CPT

## 2022-01-28 ENCOUNTER — RESULT REVIEW (OUTPATIENT)
Age: 52
End: 2022-01-28

## 2022-01-28 ENCOUNTER — APPOINTMENT (OUTPATIENT)
Dept: PSYCHIATRY | Facility: CLINIC | Age: 52
End: 2022-01-28
Payer: COMMERCIAL

## 2022-01-28 PROCEDURE — 90834 PSYTX W PT 45 MINUTES: CPT

## 2022-02-02 ENCOUNTER — APPOINTMENT (OUTPATIENT)
Dept: PSYCHIATRY | Facility: CLINIC | Age: 52
End: 2022-02-02
Payer: COMMERCIAL

## 2022-02-02 PROCEDURE — 90837 PSYTX W PT 60 MINUTES: CPT

## 2022-02-04 ENCOUNTER — APPOINTMENT (OUTPATIENT)
Dept: MAMMOGRAPHY | Facility: CLINIC | Age: 52
End: 2022-02-04
Payer: COMMERCIAL

## 2022-02-04 ENCOUNTER — OUTPATIENT (OUTPATIENT)
Dept: OUTPATIENT SERVICES | Facility: HOSPITAL | Age: 52
LOS: 1 days | End: 2022-02-04
Payer: COMMERCIAL

## 2022-02-04 ENCOUNTER — APPOINTMENT (OUTPATIENT)
Dept: ULTRASOUND IMAGING | Facility: CLINIC | Age: 52
End: 2022-02-04
Payer: COMMERCIAL

## 2022-02-04 ENCOUNTER — APPOINTMENT (OUTPATIENT)
Dept: RADIOLOGY | Facility: CLINIC | Age: 52
End: 2022-02-04
Payer: COMMERCIAL

## 2022-02-04 DIAGNOSIS — Z00.00 ENCOUNTER FOR GENERAL ADULT MEDICAL EXAMINATION WITHOUT ABNORMAL FINDINGS: ICD-10-CM

## 2022-02-04 PROCEDURE — 77067 SCR MAMMO BI INCL CAD: CPT | Mod: 26

## 2022-02-04 PROCEDURE — 77063 BREAST TOMOSYNTHESIS BI: CPT | Mod: 26

## 2022-02-04 PROCEDURE — 77080 DXA BONE DENSITY AXIAL: CPT | Mod: 26

## 2022-02-04 PROCEDURE — 76641 ULTRASOUND BREAST COMPLETE: CPT | Mod: 26,50

## 2022-02-04 PROCEDURE — 77067 SCR MAMMO BI INCL CAD: CPT

## 2022-02-04 PROCEDURE — 76641 ULTRASOUND BREAST COMPLETE: CPT

## 2022-02-04 PROCEDURE — 77080 DXA BONE DENSITY AXIAL: CPT

## 2022-02-04 PROCEDURE — 77063 BREAST TOMOSYNTHESIS BI: CPT

## 2022-02-06 ENCOUNTER — NON-APPOINTMENT (OUTPATIENT)
Age: 52
End: 2022-02-06

## 2022-02-07 ENCOUNTER — APPOINTMENT (OUTPATIENT)
Dept: DERMATOLOGY | Facility: CLINIC | Age: 52
End: 2022-02-07
Payer: COMMERCIAL

## 2022-02-07 ENCOUNTER — NON-APPOINTMENT (OUTPATIENT)
Age: 52
End: 2022-02-07

## 2022-02-07 PROCEDURE — 12032 INTMD RPR S/A/T/EXT 2.6-7.5: CPT | Mod: GC

## 2022-02-07 PROCEDURE — 17313 MOHS 1 STAGE T/A/L: CPT | Mod: GC

## 2022-02-10 ENCOUNTER — APPOINTMENT (OUTPATIENT)
Dept: PSYCHIATRY | Facility: CLINIC | Age: 52
End: 2022-02-10
Payer: COMMERCIAL

## 2022-02-10 PROCEDURE — 90832 PSYTX W PT 30 MINUTES: CPT

## 2022-02-11 ENCOUNTER — APPOINTMENT (OUTPATIENT)
Dept: PSYCHIATRY | Facility: CLINIC | Age: 52
End: 2022-02-11
Payer: COMMERCIAL

## 2022-02-11 PROCEDURE — 90834 PSYTX W PT 45 MINUTES: CPT

## 2022-02-13 ENCOUNTER — NON-APPOINTMENT (OUTPATIENT)
Age: 52
End: 2022-02-13

## 2022-02-14 ENCOUNTER — APPOINTMENT (OUTPATIENT)
Dept: DERMATOLOGY | Facility: CLINIC | Age: 52
End: 2022-02-14
Payer: COMMERCIAL

## 2022-02-14 DIAGNOSIS — C44.719 BASAL CELL CARCINOMA OF SKIN OF LEFT LOWER LIMB, INCLUDING HIP: ICD-10-CM

## 2022-02-14 PROCEDURE — 99024 POSTOP FOLLOW-UP VISIT: CPT | Mod: GC

## 2022-02-17 ENCOUNTER — APPOINTMENT (OUTPATIENT)
Dept: PSYCHIATRY | Facility: CLINIC | Age: 52
End: 2022-02-17
Payer: COMMERCIAL

## 2022-02-17 PROCEDURE — 90834 PSYTX W PT 45 MINUTES: CPT

## 2022-03-04 ENCOUNTER — APPOINTMENT (OUTPATIENT)
Dept: PSYCHIATRY | Facility: CLINIC | Age: 52
End: 2022-03-04
Payer: COMMERCIAL

## 2022-03-04 PROCEDURE — 90834 PSYTX W PT 45 MINUTES: CPT

## 2022-03-11 ENCOUNTER — APPOINTMENT (OUTPATIENT)
Dept: PSYCHIATRY | Facility: CLINIC | Age: 52
End: 2022-03-11
Payer: COMMERCIAL

## 2022-03-11 PROCEDURE — 90834 PSYTX W PT 45 MINUTES: CPT | Mod: 95

## 2022-03-22 ENCOUNTER — APPOINTMENT (OUTPATIENT)
Dept: PSYCHIATRY | Facility: CLINIC | Age: 52
End: 2022-03-22
Payer: COMMERCIAL

## 2022-03-22 PROCEDURE — 90837 PSYTX W PT 60 MINUTES: CPT

## 2022-03-30 ENCOUNTER — OUTPATIENT (OUTPATIENT)
Dept: OUTPATIENT SERVICES | Facility: HOSPITAL | Age: 52
LOS: 1 days | End: 2022-03-30
Payer: COMMERCIAL

## 2022-03-30 VITALS
WEIGHT: 111.99 LBS | HEIGHT: 62 IN | SYSTOLIC BLOOD PRESSURE: 127 MMHG | OXYGEN SATURATION: 100 % | DIASTOLIC BLOOD PRESSURE: 78 MMHG | HEART RATE: 78 BPM | RESPIRATION RATE: 18 BRPM | TEMPERATURE: 99 F

## 2022-03-30 DIAGNOSIS — D26.1 OTHER BENIGN NEOPLASM OF CORPUS UTERI: ICD-10-CM

## 2022-03-30 DIAGNOSIS — Z01.818 ENCOUNTER FOR OTHER PREPROCEDURAL EXAMINATION: ICD-10-CM

## 2022-03-30 DIAGNOSIS — Z90.411 ACQUIRED PARTIAL ABSENCE OF PANCREAS: Chronic | ICD-10-CM

## 2022-03-30 DIAGNOSIS — N84.0 POLYP OF CORPUS UTERI: ICD-10-CM

## 2022-03-30 LAB
ANION GAP SERPL CALC-SCNC: 10 MMOL/L — SIGNIFICANT CHANGE UP (ref 5–17)
BLD GP AB SCN SERPL QL: NEGATIVE — SIGNIFICANT CHANGE UP
BUN SERPL-MCNC: 9 MG/DL — SIGNIFICANT CHANGE UP (ref 7–23)
CALCIUM SERPL-MCNC: 9.7 MG/DL — SIGNIFICANT CHANGE UP (ref 8.4–10.5)
CHLORIDE SERPL-SCNC: 101 MMOL/L — SIGNIFICANT CHANGE UP (ref 96–108)
CO2 SERPL-SCNC: 29 MMOL/L — SIGNIFICANT CHANGE UP (ref 22–31)
CREAT SERPL-MCNC: 0.62 MG/DL — SIGNIFICANT CHANGE UP (ref 0.5–1.3)
EGFR: 108 ML/MIN/1.73M2 — SIGNIFICANT CHANGE UP
GLUCOSE SERPL-MCNC: 97 MG/DL — SIGNIFICANT CHANGE UP (ref 70–99)
HCT VFR BLD CALC: 40.4 % — SIGNIFICANT CHANGE UP (ref 34.5–45)
HGB BLD-MCNC: 13.6 G/DL — SIGNIFICANT CHANGE UP (ref 11.5–15.5)
MCHC RBC-ENTMCNC: 31.1 PG — SIGNIFICANT CHANGE UP (ref 27–34)
MCHC RBC-ENTMCNC: 33.7 GM/DL — SIGNIFICANT CHANGE UP (ref 32–36)
MCV RBC AUTO: 92.2 FL — SIGNIFICANT CHANGE UP (ref 80–100)
NRBC # BLD: 0 /100 WBCS — SIGNIFICANT CHANGE UP (ref 0–0)
PLATELET # BLD AUTO: 284 K/UL — SIGNIFICANT CHANGE UP (ref 150–400)
POTASSIUM SERPL-MCNC: 4.1 MMOL/L — SIGNIFICANT CHANGE UP (ref 3.5–5.3)
POTASSIUM SERPL-SCNC: 4.1 MMOL/L — SIGNIFICANT CHANGE UP (ref 3.5–5.3)
RBC # BLD: 4.38 M/UL — SIGNIFICANT CHANGE UP (ref 3.8–5.2)
RBC # FLD: 12 % — SIGNIFICANT CHANGE UP (ref 10.3–14.5)
RH IG SCN BLD-IMP: POSITIVE — SIGNIFICANT CHANGE UP
SODIUM SERPL-SCNC: 140 MMOL/L — SIGNIFICANT CHANGE UP (ref 135–145)
WBC # BLD: 4.77 K/UL — SIGNIFICANT CHANGE UP (ref 3.8–10.5)
WBC # FLD AUTO: 4.77 K/UL — SIGNIFICANT CHANGE UP (ref 3.8–10.5)

## 2022-03-30 PROCEDURE — 86901 BLOOD TYPING SEROLOGIC RH(D): CPT

## 2022-03-30 PROCEDURE — 86850 RBC ANTIBODY SCREEN: CPT

## 2022-03-30 PROCEDURE — 85027 COMPLETE CBC AUTOMATED: CPT

## 2022-03-30 PROCEDURE — 86900 BLOOD TYPING SEROLOGIC ABO: CPT

## 2022-03-30 PROCEDURE — 80048 BASIC METABOLIC PNL TOTAL CA: CPT

## 2022-03-30 PROCEDURE — 36415 COLL VENOUS BLD VENIPUNCTURE: CPT

## 2022-03-30 PROCEDURE — G0463: CPT

## 2022-03-30 RX ORDER — LIDOCAINE HCL 20 MG/ML
0.2 VIAL (ML) INJECTION ONCE
Refills: 0 | Status: DISCONTINUED | OUTPATIENT
Start: 2022-04-11 | End: 2022-04-25

## 2022-03-30 RX ORDER — SODIUM CHLORIDE 9 MG/ML
1000 INJECTION, SOLUTION INTRAVENOUS
Refills: 0 | Status: DISCONTINUED | OUTPATIENT
Start: 2022-04-11 | End: 2022-04-25

## 2022-03-30 NOTE — H&P PST ADULT - ATTENDING COMMENTS
I consented the patient for the proposed procedure including for a pelvic examination under anesthesia by myself and any assistants in the room.

## 2022-03-30 NOTE — H&P PST ADULT - NSICDXPASTSURGICALHX_GEN_ALL_CORE_FT
PAST SURGICAL HISTORY:  History of partial pancreatectomy Distal 2010' & 19'     PAST SURGICAL HISTORY:  History of partial pancreatectomy Distal 2010' & ex lap 19'

## 2022-03-30 NOTE — H&P PST ADULT - NSICDXPASTMEDICALHX_GEN_ALL_CORE_FT
PAST MEDICAL HISTORY:  Other benign neoplasm of corpus uteri     Polyp of corpus uteri     Symptoms of depression      PAST MEDICAL HISTORY:  Insulinoma s/p partial pancreatectomy x2    Other benign neoplasm of corpus uteri     Polyp of corpus uteri     Symptoms of depression

## 2022-03-30 NOTE — H&P PST ADULT - HISTORY OF PRESENT ILLNESS
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 PMH of Insulinoma (s/p distal pancreatectomy x2). Denies hx of COVID or recent fevers, chills, cough, chest pain or SOB and feels well otherwise. Pt fully vaccinated. COVID testing scheduled at [facility] on 4/7/22.

## 2022-03-30 NOTE — H&P PST ADULT - PROBLEM SELECTOR PLAN 1
Surgery scheduled on 4/11/22. Surgical instructions reviewed w/ pt. COVID testing scheduled at [facility] on 4/7/22.

## 2022-03-30 NOTE — H&P PST ADULT - DATE OF LAST VACCINATION
Problem: Prexisting or High Potential for Compromised Skin Integrity  Goal: Skin integrity is maintained or improved  Description: INTERVENTIONS:  - Identify patients at risk for skin breakdown  - Assess and monitor skin integrity  - Assess and monitor nutrition and hydration status  - Monitor labs   - Assess for incontinence   - Turn and reposition patient  - Assist with mobility/ambulation  - Relieve pressure over bony prominences  - Avoid friction and shearing  - Provide appropriate hygiene as needed including keeping skin clean and dry  - Evaluate need for skin moisturizer/barrier cream  - Collaborate with interdisciplinary team   - Patient/family teaching  - Consider wound care consult   Outcome: Progressing     Problem: MOBILITY - ADULT  Goal: Maintain or return to baseline ADL function  Description: INTERVENTIONS:  -  Assess patient's ability to carry out ADLs; assess patient's baseline for ADL function and identify physical deficits which impact ability to perform ADLs (bathing, care of mouth/teeth, toileting, grooming, dressing, etc )  - Assess/evaluate cause of self-care deficits   - Assess range of motion  - Assess patient's mobility; develop plan if impaired  - Assess patient's need for assistive devices and provide as appropriate  - Encourage maximum independence but intervene and supervise when necessary  - Involve family in performance of ADLs  - Assess for home care needs following discharge   - Consider OT consult to assist with ADL evaluation and planning for discharge  - Provide patient education as appropriate  Outcome: Progressing  Goal: Maintains/Returns to pre admission functional level  Description: INTERVENTIONS:  - Perform BMAT or MOVE assessment daily    - Set and communicate daily mobility goal to care team and patient/family/caregiver  - Collaborate with rehabilitation services on mobility goals if consulted  - Perform Range of Motion 3 times a day    - Reposition patient every 2 hours   - Dangle patient 3 times a day  - Stand patient 3 times a day  - Ambulate patient 3 times a day  - Out of bed to chair 3 times a day   - Out of bed for meals 3 times a day  - Out of bed for toileting  - Record patient progress and toleration of activity level   Outcome: Progressing     Problem: Potential for Falls  Goal: Patient will remain free of falls  Description: INTERVENTIONS:  - Educate patient/family on patient safety including physical limitations  - Instruct patient to call for assistance with activity   - Consult OT/PT to assist with strengthening/mobility   - Keep Call bell within reach  - Keep bed low and locked with side rails adjusted as appropriate  - Keep care items and personal belongings within reach  - Initiate and maintain comfort rounds  - Make Fall Risk Sign visible to staff  - Offer Toileting every 2 Hours, in advance of need  - Initiate/Maintain bed alarm  - Apply yellow socks and bracelet for high fall risk patients  - Consider moving patient to room near nurses station  Outcome: Progressing     Problem: GASTROINTESTINAL - ADULT  Goal: Minimal or absence of nausea and/or vomiting  Description: INTERVENTIONS:  - Administer IV fluids if ordered to ensure adequate hydration  - Maintain NPO status until nausea and vomiting are resolved  - Nasogastric tube if ordered  - Administer ordered antiemetic medications as needed  - Provide nonpharmacologic comfort measures as appropriate  - Advance diet as tolerated, if ordered  - Consider nutrition services referral to assist patient with adequate nutrition and appropriate food choices  Outcome: Progressing  Goal: Maintains or returns to baseline bowel function  Description: INTERVENTIONS:  - Assess bowel function  - Encourage oral fluids to ensure adequate hydration  - Administer IV fluids if ordered to ensure adequate hydration  - Administer ordered medications as needed  - Encourage mobilization and activity  - Consider nutritional services referral to assist patient with adequate nutrition and appropriate food choices  Outcome: Progressing  Goal: Maintains adequate nutritional intake  Description: INTERVENTIONS:  - Monitor percentage of each meal consumed  - Identify factors contributing to decreased intake, treat as appropriate  - Assist with meals as needed  - Monitor I&O, weight, and lab values if indicated  - Obtain nutrition services referral as needed  Outcome: Progressing  Goal: Establish and maintain optimal ostomy function  Description: INTERVENTIONS:  - Assess bowel function  - Encourage oral fluids to ensure adequate hydration  - Administer IV fluids if ordered to ensure adequate hydration   - Administer ordered medications as needed  - Encourage mobilization and activity  - Nutrition services referral to assist patient with appropriate food choices  - Assess stoma site  - Consider wound care consult   Outcome: Progressing  Goal: Oral mucous membranes remain intact  Description: INTERVENTIONS  - Assess oral mucosa and hygiene practices  - Implement preventative oral hygiene regimen  - Implement oral medicated treatments as ordered  - Initiate Nutrition services referral as needed  Outcome: Progressing     Problem: GENITOURINARY - ADULT  Goal: Maintains or returns to baseline urinary function  Description: INTERVENTIONS:  - Assess urinary function  - Encourage oral fluids to ensure adequate hydration if ordered  - Administer IV fluids as ordered to ensure adequate hydration  - Administer ordered medications as needed  - Offer frequent toileting  - Follow urinary retention protocol if ordered  Outcome: Progressing  Goal: Absence of urinary retention  Description: INTERVENTIONS:  - Assess patient's ability to void and empty bladder  - Monitor I/O  - Bladder scan as needed  - Discuss with physician/AP medications to alleviate retention as needed  - Discuss catheterization for long term situations as appropriate  Outcome: Progressing  Goal: Urinary catheter remains patent  Description: INTERVENTIONS:  - Assess patency of urinary catheter  - If patient has a chronic villanueva, consider changing catheter if non-functioning  - Follow guidelines for intermittent irrigation of non-functioning urinary catheter  Outcome: Progressing     Problem: SKIN/TISSUE INTEGRITY - ADULT  Goal: Skin Integrity remains intact(Skin Breakdown Prevention)  Description: Assess:  -Perform Surendra assessment every 12  -Clean and moisturize skin every 4  -Inspect skin when repositioning, toileting, and assisting with ADLS  -Assess extremities for adequate circulation and sensation     Bed Management:  -Have minimal linens on bed & keep smooth, unwrinkled  -Change linens as needed when moist or perspiring  -Avoid sitting or lying in one position for more than 2 hours while in bed  -Keep HOB at 30degrees     Toileting:  -Offer bedside commode  -Assess for incontinence every 2    Activity:  -Mobilize patient 3 times a day  -Encourage activity and walks on unit  -Encourage or provide ROM exercises   -Turn and reposition patient every 2 Hours  -Use appropriate equipment to lift or move patient in bed  -Instruct/ Assist with weight shifting every 2 hour when out of bed in chair  -Consider limitation of chair time 8 hour intervals    Skin Care:  -Avoid use of baby powder, tape, friction and shearing, hot water or constrictive clothing  -Relieve pressure over bony prominences using allevyns  -Do not massage red bony areas  Outcome: Progressing  Goal: Incision(s), wounds(s) or drain site(s) healing without S/S of infection  Description: INTERVENTIONS  - Assess and document dressing, incision, wound bed, drain sites and surrounding tissue  - Provide patient and family education  - Perform skin care/dressing changes as needed  Outcome: Progressing  Goal: Pressure injury heals and does not worsen  Description: Interventions:  - Implement low air loss mattress or specialty surface (Criteria met)  - Apply silicone foam dressing  - Instruct/assist with weight shifting every 120 minutes when in chair   - Limit chair time to 8 hour intervals  - Use special pressure reducing interventions such as allevyns when in chair   - Apply fecal or urinary incontinence containment device   - Perform passive or active ROM every 4  - Turn and reposition patient & offload bony prominences every 2 hours   - Utilize friction reducing device or surface for transfers   - Consider nutrition services referral as needed  Outcome: Progressing     Problem: PAIN - ADULT  Goal: Verbalizes/displays adequate comfort level or baseline comfort level  Description: Interventions:  - Encourage patient to monitor pain and request assistance  - Assess pain using appropriate pain scale  - Administer analgesics based on type and severity of pain and evaluate response  - Implement non-pharmacological measures as appropriate and evaluate response  - Consider cultural and social influences on pain and pain management  - Notify physician/advanced practitioner if interventions unsuccessful or patient reports new pain  Outcome: Progressing     Problem: INFECTION - ADULT  Goal: Absence or prevention of progression during hospitalization  Description: INTERVENTIONS:  - Assess and monitor for signs and symptoms of infection  - Monitor lab/diagnostic results  - Monitor all insertion sites, i e  indwelling lines, tubes, and drains  - Monitor endotracheal if appropriate and nasal secretions for changes in amount and color  - [place] appropriate cooling/warming therapies per order  - Administer medications as ordered  - Instruct and encourage patient and family to use good hand hygiene technique  - Identify and instruct in appropriate isolation precautions for identified infection/condition  Outcome: Progressing     Problem: SAFETY ADULT  Goal: Maintain or return to baseline ADL function  Description: INTERVENTIONS:  -  Assess patient's ability to carry out ADLs; assess patient's baseline for ADL function and identify physical deficits which impact ability to perform ADLs (bathing, care of mouth/teeth, toileting, grooming, dressing, etc )  - Assess/evaluate cause of self-care deficits   - Assess range of motion  - Assess patient's mobility; develop plan if impaired  - Assess patient's need for assistive devices and provide as appropriate  - Encourage maximum independence but intervene and supervise when necessary  - Involve family in performance of ADLs  - Assess for home care needs following discharge   - Consider OT consult to assist with ADL evaluation and planning for discharge  - Provide patient education as appropriate  Outcome: Progressing  Goal: Maintains/Returns to pre admission functional level  Description: INTERVENTIONS:  - Perform BMAT or MOVE assessment daily    - Set and communicate daily mobility goal to care team and patient/family/caregiver  - Collaborate with rehabilitation services on mobility goals if consulted  - Perform Range of Motion 3 times a day  - Reposition patient every 2 hours    - Dangle patient 3 times a day  - Stand patient 3 times a day  - Ambulate patient 3 times a day  - Out of bed to chair 3 times a day   - Out of bed for meals 3 times a day  - Out of bed for toileting  - Record patient progress and toleration of activity level   Outcome: Progressing  Goal: Patient will remain free of falls  Description: INTERVENTIONS:  - Educate patient/family on patient safety including physical limitations  - Instruct patient to call for assistance with activity   - Consult OT/PT to assist with strengthening/mobility   - Keep Call bell within reach  - Keep bed low and locked with side rails adjusted as appropriate  - Keep care items and personal belongings within reach  - Initiate and maintain comfort rounds  - Make Fall Risk Sign visible to staff  - Offer Toileting every 2 Hours, in advance of need  - Initiate/Maintain bed alarm  - Apply yellow socks and bracelet for high fall risk patients  - Consider moving patient to room near nurses station  Outcome: Progressing     Problem: DISCHARGE PLANNING  Goal: Discharge to home or other facility with appropriate resources  Description: INTERVENTIONS:  - Identify barriers to discharge w/patient and caregiver  - Arrange for needed discharge resources and transportation as appropriate  - Identify discharge learning needs (meds, wound care, etc )  - Arrange for interpretive services to assist at discharge as needed  - Refer to Case Management Department for coordinating discharge planning if the patient needs post-hospital services based on physician/advanced practitioner order or complex needs related to functional status, cognitive ability, or social support system  Outcome: Progressing     Problem: Knowledge Deficit  Goal: Patient/family/caregiver demonstrates understanding of disease process, treatment plan, medications, and discharge instructions  Description: Complete learning assessment and assess knowledge base    Interventions:  - Provide teaching at level of understanding  - Provide teaching via preferred learning methods  Outcome: Progressing     Problem: SAFETY,RESTRAINT: NV/NON-SELF DESTRUCTIVE BEHAVIOR  Goal: Remains free of harm/injury (restraint for non violent/non self-detsructive behavior)  Description: INTERVENTIONS:  - Instruct patient/family regarding restraint use   - Assess and monitor physiologic and psychological status   - Provide interventions and comfort measures to meet assessed patient needs   - Identify and implement measures to help patient regain control  - Assess readiness for release of restraint   Outcome: Progressing  Goal: Returns to optimal restraint-free functioning  Description: INTERVENTIONS:  - Assess the patient's behavior and symptoms that indicate continued need for restraint  - Identify and implement measures to help patient regain control  - Assess readiness for release of restraint   Outcome: Progressing     Problem: Nutrition/Hydration-ADULT  Goal: Nutrient/Hydration intake appropriate for improving, restoring or maintaining nutritional needs  Description: Monitor and assess patient's nutrition/hydration status for malnutrition  Collaborate with interdisciplinary team and initiate plan and interventions as ordered  Monitor patient's weight and dietary intake as ordered or per policy  Utilize nutrition screening tool and intervene as necessary  Determine patient's food preferences and provide high-protein, high-caloric foods as appropriate       INTERVENTIONS:  - Monitor oral intake, urinary output, labs, and treatment plans  - Assess nutrition and hydration status and recommend course of action  - Evaluate amount of meals eaten  - Assist patient with eating if necessary   - Allow adequate time for meals  - Recommend/ encourage appropriate diets, oral nutritional supplements, and vitamin/mineral supplements  - Order, calculate, and assess calorie counts as needed  - Recommend, monitor, and adjust tube feedings and TPN/PPN based on assessed needs  - Assess need for intravenous fluids  - Provide specific nutrition/hydration education as appropriate  - Include patient/family/caregiver in decisions related to nutrition  Outcome: Progressing     Problem: CHANGE IN BODY IMAGE  Goal: Pt/Family communicate acceptance of loss or change in body image and expresses psychological comfort and peace  Description: INTERVENTIONS:  - Assess patient/family anxiety and grief process related to change in body image, loss of functional status and loss of sense of self  - Assess patient/family's coping skills and provide emotional, spiritual and psychosocial support  - Provide information about the patient's health status with consideration of family and cultural values  - Communicate willingness to discuss loss and facilitate grief process with patient/family as appropriate  - Emphasize sustaining relationships within family system and community, or farzaneh/spiritual traditions  - Refer to community support groups as appropriate  - [phone], Pastoral care or other ancillary consults as needed  Outcome: Progressing 10-Jose-2021

## 2022-04-01 ENCOUNTER — APPOINTMENT (OUTPATIENT)
Dept: PSYCHIATRY | Facility: CLINIC | Age: 52
End: 2022-04-01
Payer: COMMERCIAL

## 2022-04-01 PROCEDURE — 90834 PSYTX W PT 45 MINUTES: CPT

## 2022-04-04 ENCOUNTER — NON-APPOINTMENT (OUTPATIENT)
Age: 52
End: 2022-04-04

## 2022-04-04 ENCOUNTER — APPOINTMENT (OUTPATIENT)
Dept: INTERNAL MEDICINE | Facility: CLINIC | Age: 52
End: 2022-04-04
Payer: COMMERCIAL

## 2022-04-04 VITALS
HEIGHT: 62 IN | BODY MASS INDEX: 20.8 KG/M2 | WEIGHT: 113 LBS | DIASTOLIC BLOOD PRESSURE: 74 MMHG | HEART RATE: 69 BPM | SYSTOLIC BLOOD PRESSURE: 117 MMHG

## 2022-04-04 DIAGNOSIS — D13.7 BENIGN NEOPLASM OF ENDOCRINE PANCREAS: ICD-10-CM

## 2022-04-04 DIAGNOSIS — F41.9 ANXIETY DISORDER, UNSPECIFIED: ICD-10-CM

## 2022-04-04 PROCEDURE — 36415 COLL VENOUS BLD VENIPUNCTURE: CPT

## 2022-04-04 PROCEDURE — 93000 ELECTROCARDIOGRAM COMPLETE: CPT

## 2022-04-04 PROCEDURE — 99386 PREV VISIT NEW AGE 40-64: CPT | Mod: 25

## 2022-04-04 RX ORDER — ESCITALOPRAM OXALATE 10 MG/1
10 TABLET ORAL
Qty: 30 | Refills: 0 | Status: DISCONTINUED | COMMUNITY
Start: 2021-11-12 | End: 2022-04-04

## 2022-04-04 RX ORDER — FLUOROURACIL 50 MG/G
5 CREAM TOPICAL
Qty: 1 | Refills: 1 | Status: DISCONTINUED | COMMUNITY
Start: 2020-01-23 | End: 2022-04-04

## 2022-04-04 RX ORDER — ALPRAZOLAM 0.25 MG/1
0.25 TABLET ORAL
Qty: 30 | Refills: 0 | Status: ACTIVE | COMMUNITY
Start: 2021-06-15 | End: 1900-01-01

## 2022-04-04 RX ORDER — RISPERIDONE 0.5 MG/1
0.5 TABLET, FILM COATED ORAL
Qty: 30 | Refills: 0 | Status: ACTIVE | COMMUNITY
Start: 2021-11-12

## 2022-04-04 RX ORDER — EFLORNITHINE HYDROCHLORIDE 139 MG/G
13.9 CREAM TOPICAL
Qty: 1 | Refills: 5 | Status: DISCONTINUED | COMMUNITY
Start: 2017-09-29 | End: 2022-04-04

## 2022-04-04 RX ORDER — ESCITALOPRAM OXALATE 5 MG/1
5 TABLET ORAL DAILY
Qty: 270 | Refills: 3 | Status: ACTIVE | COMMUNITY
Start: 2021-07-12 | End: 1900-01-01

## 2022-04-05 LAB
25(OH)D3 SERPL-MCNC: 35.9 NG/ML
ALBUMIN SERPL ELPH-MCNC: 4.7 G/DL
ALP BLD-CCNC: 59 U/L
ALT SERPL-CCNC: 14 U/L
ANION GAP SERPL CALC-SCNC: 10 MMOL/L
AST SERPL-CCNC: 20 U/L
BASOPHILS # BLD AUTO: 0.06 K/UL
BASOPHILS NFR BLD AUTO: 1.1 %
BILIRUB SERPL-MCNC: 0.3 MG/DL
BUN SERPL-MCNC: 10 MG/DL
CALCIUM SERPL-MCNC: 9.8 MG/DL
CHLORIDE SERPL-SCNC: 100 MMOL/L
CHOLEST SERPL-MCNC: 222 MG/DL
CO2 SERPL-SCNC: 29 MMOL/L
CREAT SERPL-MCNC: 0.6 MG/DL
EGFR: 109 ML/MIN/1.73M2
EOSINOPHIL # BLD AUTO: 0.05 K/UL
EOSINOPHIL NFR BLD AUTO: 1 %
ESTIMATED AVERAGE GLUCOSE: 105 MG/DL
GLUCOSE SERPL-MCNC: 100 MG/DL
HBA1C MFR BLD HPLC: 5.3 %
HCT VFR BLD CALC: 39 %
HDLC SERPL-MCNC: 95 MG/DL
HGB BLD-MCNC: 12.9 G/DL
IMM GRANULOCYTES NFR BLD AUTO: 0.2 %
LDLC SERPL CALC-MCNC: 112 MG/DL
LYMPHOCYTES # BLD AUTO: 1.93 K/UL
LYMPHOCYTES NFR BLD AUTO: 37 %
MAN DIFF?: NORMAL
MCHC RBC-ENTMCNC: 31.4 PG
MCHC RBC-ENTMCNC: 33.1 GM/DL
MCV RBC AUTO: 94.9 FL
MONOCYTES # BLD AUTO: 0.47 K/UL
MONOCYTES NFR BLD AUTO: 9 %
NEUTROPHILS # BLD AUTO: 2.7 K/UL
NEUTROPHILS NFR BLD AUTO: 51.7 %
NONHDLC SERPL-MCNC: 127 MG/DL
PLATELET # BLD AUTO: 272 K/UL
POTASSIUM SERPL-SCNC: 4.1 MMOL/L
PROT SERPL-MCNC: 6.9 G/DL
RBC # BLD: 4.11 M/UL
RBC # FLD: 12.2 %
SODIUM SERPL-SCNC: 139 MMOL/L
T4 SERPL-MCNC: 4.9 UG/DL
TRIGL SERPL-MCNC: 75 MG/DL
TSH SERPL-ACNC: 0.78 UIU/ML
URATE SERPL-MCNC: 5.7 MG/DL
WBC # FLD AUTO: 5.22 K/UL

## 2022-04-05 NOTE — ASSESSMENT
[FreeTextEntry1] : This is a 51 year -old  F who was examined today for an annual preventative physical.  A complete history and physical examination were performed.  The patient seems to follow a healthy lifestyle in that she  follows a good diet and exercises regularly.  \par \par Physical examination was entirely within normal limits , including a normal blood pressure and pulse.  \par \par The following recommendations were made:\par Exercise regularly.\par Maintain a healthy diet.\par _________________________\par \par \par Ordered appropriate labs based on diagnosis and prevention .\par \par Patient was seen and examined for medical clearance. A full H and P was performed. Vitals taken.\par \par Pt clear for procedure

## 2022-04-05 NOTE — HEALTH RISK ASSESSMENT
[Good] : ~his/her~  mood as  good [No falls in past year] : Patient reported no falls in the past year [0] : 2) Feeling down, depressed, or hopeless: Not at all (0) [PHQ-2 Negative - No further assessment needed] : PHQ-2 Negative - No further assessment needed [Patient reported mammogram was normal] : Patient reported mammogram was normal [[id number]] : 0 [MammogramComments] : 2022--january

## 2022-04-05 NOTE — HISTORY OF PRESENT ILLNESS
[de-identified] : This is annual Preventative examination for this 51 year old White female here to establish care and also have medical clearance..  The patient has been generally feeling well with no new complaints. She does have some anxiety which she is managing. A complete evaluation of their current medication was reviewed with them including OTC medication .\par \par Chronic medical problems for which they are being followed by a physician are:\par Insulinoma \par    \par \par A complete Review of Systems is below but it is noteworthy to mention that this patient denies Chest Pain, Dyspnea on Exertion, Palpitations, urinary problems, rectal bleeding or Gastrointestinal problems at this time.\par \par D and C--proliferative endometrium\par Dr Rveeles\par 4/11

## 2022-04-06 ENCOUNTER — APPOINTMENT (OUTPATIENT)
Dept: PSYCHIATRY | Facility: CLINIC | Age: 52
End: 2022-04-06
Payer: COMMERCIAL

## 2022-04-06 PROCEDURE — 90837 PSYTX W PT 60 MINUTES: CPT

## 2022-04-07 ENCOUNTER — OUTPATIENT (OUTPATIENT)
Dept: OUTPATIENT SERVICES | Facility: HOSPITAL | Age: 52
LOS: 1 days | End: 2022-04-07
Payer: COMMERCIAL

## 2022-04-07 DIAGNOSIS — Z11.52 ENCOUNTER FOR SCREENING FOR COVID-19: ICD-10-CM

## 2022-04-07 DIAGNOSIS — Z90.411 ACQUIRED PARTIAL ABSENCE OF PANCREAS: Chronic | ICD-10-CM

## 2022-04-07 LAB — SARS-COV-2 RNA SPEC QL NAA+PROBE: SIGNIFICANT CHANGE UP

## 2022-04-07 PROCEDURE — U0003: CPT

## 2022-04-07 PROCEDURE — C9803: CPT

## 2022-04-07 PROCEDURE — U0005: CPT

## 2022-04-08 RX ORDER — APREPITANT 80 MG/1
40 CAPSULE ORAL ONCE
Refills: 0 | Status: DISCONTINUED | OUTPATIENT
Start: 2022-04-11 | End: 2022-04-25

## 2022-04-10 ENCOUNTER — TRANSCRIPTION ENCOUNTER (OUTPATIENT)
Age: 52
End: 2022-04-10

## 2022-04-11 ENCOUNTER — OUTPATIENT (OUTPATIENT)
Dept: OUTPATIENT SERVICES | Facility: HOSPITAL | Age: 52
LOS: 1 days | End: 2022-04-11
Payer: COMMERCIAL

## 2022-04-11 ENCOUNTER — TRANSCRIPTION ENCOUNTER (OUTPATIENT)
Age: 52
End: 2022-04-11

## 2022-04-11 ENCOUNTER — RESULT REVIEW (OUTPATIENT)
Age: 52
End: 2022-04-11

## 2022-04-11 VITALS
OXYGEN SATURATION: 99 % | DIASTOLIC BLOOD PRESSURE: 55 MMHG | TEMPERATURE: 98 F | HEIGHT: 62 IN | RESPIRATION RATE: 16 BRPM | HEART RATE: 54 BPM | WEIGHT: 111.99 LBS | SYSTOLIC BLOOD PRESSURE: 108 MMHG

## 2022-04-11 VITALS
DIASTOLIC BLOOD PRESSURE: 60 MMHG | RESPIRATION RATE: 14 BRPM | HEART RATE: 66 BPM | SYSTOLIC BLOOD PRESSURE: 111 MMHG | OXYGEN SATURATION: 97 %

## 2022-04-11 DIAGNOSIS — N84.0 POLYP OF CORPUS UTERI: ICD-10-CM

## 2022-04-11 DIAGNOSIS — Z90.411 ACQUIRED PARTIAL ABSENCE OF PANCREAS: Chronic | ICD-10-CM

## 2022-04-11 DIAGNOSIS — D26.1 OTHER BENIGN NEOPLASM OF CORPUS UTERI: ICD-10-CM

## 2022-04-11 LAB — RH IG SCN BLD-IMP: POSITIVE — SIGNIFICANT CHANGE UP

## 2022-04-11 PROCEDURE — 88305 TISSUE EXAM BY PATHOLOGIST: CPT | Mod: 26

## 2022-04-11 PROCEDURE — 58558 HYSTEROSCOPY BIOPSY: CPT

## 2022-04-11 PROCEDURE — 88305 TISSUE EXAM BY PATHOLOGIST: CPT

## 2022-04-11 DEVICE — MYOSURE TISSUE REMOVAL FMS FOR FLUENT XL
Type: IMPLANTABLE DEVICE | Status: NON-FUNCTIONAL
Removed: 2022-04-11

## 2022-04-11 DEVICE — MYOSURE TISSUE REMOVAL DEVICE REACH
Type: IMPLANTABLE DEVICE | Status: NON-FUNCTIONAL
Removed: 2022-04-11

## 2022-04-11 DEVICE — MYOSURE TISSUE REMOVAL DEVICE LITE
Type: IMPLANTABLE DEVICE | Status: NON-FUNCTIONAL
Removed: 2022-04-11

## 2022-04-11 RX ORDER — OXYCODONE HYDROCHLORIDE 5 MG/1
5 TABLET ORAL ONCE
Refills: 0 | Status: DISCONTINUED | OUTPATIENT
Start: 2022-04-11 | End: 2022-04-11

## 2022-04-11 RX ORDER — ESCITALOPRAM OXALATE 10 MG/1
1 TABLET, FILM COATED ORAL
Qty: 0 | Refills: 0 | DISCHARGE

## 2022-04-11 RX ORDER — ONDANSETRON 8 MG/1
4 TABLET, FILM COATED ORAL ONCE
Refills: 0 | Status: DISCONTINUED | OUTPATIENT
Start: 2022-04-11 | End: 2022-04-25

## 2022-04-11 RX ORDER — APREPITANT 80 MG/1
40 CAPSULE ORAL ONCE
Refills: 0 | Status: COMPLETED | OUTPATIENT
Start: 2022-04-11 | End: 2022-04-11

## 2022-04-11 RX ADMIN — APREPITANT 40 MILLIGRAM(S): 80 CAPSULE ORAL at 08:07

## 2022-04-11 RX ADMIN — SODIUM CHLORIDE 100 MILLILITER(S): 9 INJECTION, SOLUTION INTRAVENOUS at 08:07

## 2022-04-11 NOTE — ASU DISCHARGE PLAN (ADULT/PEDIATRIC) - CARE PROVIDER_API CALL
Yadira Reveles)  Obstetrics and Gynecology  [address], [address]  [place] [address]  Phone: [phone]  Fax: [phone]  Established Patient  Follow Up Time: 2 weeks

## 2022-04-11 NOTE — ASU PATIENT PROFILE, ADULT - NSICDXPASTMEDICALHX_GEN_ALL_CORE_FT
PAST MEDICAL HISTORY:  Insulinoma s/p partial pancreatectomy x2    Other benign neoplasm of corpus uteri     Polyp of corpus uteri     Symptoms of depression

## 2022-04-11 NOTE — ASU DISCHARGE PLAN (ADULT/PEDIATRIC) - NURSING INSTRUCTIONS
******************************************************************************************  You may take TYLENOL (acetaminophen) after _______4:15______ PM if needed for pain. DO NOT take any additional products containing TYLENOL or ACETAMINOPHEN, such as VICODIN, PERCOCET, NORCO, EXCEDRIN, and any over-the-counter cold medications until this time. DO NOT CONSUME MORE THAN 8157-8369 MG of TYLENOL (acetaminophen) in a 24-hour period.   ******************************************************************************************

## 2022-04-11 NOTE — ASU DISCHARGE PLAN (ADULT/PEDIATRIC) - ASU DC SPECIAL INSTRUCTIONSFT
After your surgery it is normal to experience:    •	Vaginal bleeding- can last 1-2 weeks and should not be heavier than a period. It may come and go and be red, brown or pink. Use pads, not tampons.  •	Cramping- Is common especially within the first 24 hours. This should be relieved by taking over the counter motrin, advil or Tylenol.  •	Watery discharge- can be seen for a day or two after hysteroscopy because some of the fluid that is put into the uterus during surgery may continue to leak out for a day or two.  •	Vaginal soreness/irritation- can occur in the first few days after surgery because of the instruments that were used in the vagina. Soreness can be treated with ice pack and irritation can be taken care of with an emollient such as balmex or aquaphor that you can put directly on the irritated area.    Restrictions: For 10-14 days after the surgery you should avoid the following:    •	Tampons  •	Sex  •	Vigorous gym exercise  •	Swimming  pools and tub baths  •	Wait a day or two before going back to work    Anesthesia Precautions:  For the next 12 hours do not:   •	drive a car,  •	 operate power tools or machinery,  •	drink alcohol, beer, or wine,   •	make important personal or business decisions    Diet:   •	Resume Regular diet but Progress diet slowly     Physician Notification- Warning signs to look out for  •	Heavy Vaginal Bleeding   •	Shortness of breath or chest pain  •	Severe Abdominal Pain  •	Persistent nausea and vomiting  •	Pain not relieved by medications  •	Fever greater than 100.5®F  •	Inability to tolerate liquids or foods  •	Unable to urinate after 8 hours    Discharge and Disposition  •	Discharge to home    Follow Up Care:  •	In the event that you develop a complication and you are unable to reach your own physician, you may contact:  [phone] or go to the nearest Emergency Room.   •	Please contact the office to make a follow up appointment: [phone]

## 2022-04-11 NOTE — BRIEF OPERATIVE NOTE - NSICDXBRIEFPROCEDURE_GEN_ALL_CORE_FT
PROCEDURES:  Hysteroscopy, with dilation and curettage of uterus and polypectomy or uterine myomectomy using MyoSure tissue removal system 11-Apr-2022 10:59:05  Yadira Reveles

## 2022-04-11 NOTE — ASU DISCHARGE PLAN (ADULT/PEDIATRIC) - NS MD DC FALL RISK RISK
For information on Fall & Injury Prevention, visit: [email or web address] OR  [email or web address] OR  https://www.cdc.gov/steadi/patient.html

## 2022-04-11 NOTE — BRIEF OPERATIVE NOTE - OPERATION/FINDINGS
EUA with small mobile anteverted uterus.   Hysteroscopy revealed overall atrophic endometrium. Small possibly sessile polyp noted near the right ostia anteriorly. Ostia appeared normal bilaterally

## 2022-04-11 NOTE — ASU PREOP CHECKLIST - NEEDLE GAUGE
Visit Date: 03/03/2022     This consult has been requested by Dr. Ortega for lymphopenia.     Mr. Flanagan BLAIR MARSHALL  MRN:      [id number]        Account:    [id number]   :      1951           Visit Date: 2022      Document: [id number]   20

## 2022-04-13 ENCOUNTER — APPOINTMENT (OUTPATIENT)
Dept: PSYCHIATRY | Facility: CLINIC | Age: 52
End: 2022-04-13
Payer: COMMERCIAL

## 2022-04-13 PROCEDURE — 90834 PSYTX W PT 45 MINUTES: CPT

## 2022-04-22 ENCOUNTER — APPOINTMENT (OUTPATIENT)
Dept: PSYCHIATRY | Facility: CLINIC | Age: 52
End: 2022-04-22

## 2022-04-29 ENCOUNTER — APPOINTMENT (OUTPATIENT)
Dept: PSYCHIATRY | Facility: CLINIC | Age: 52
End: 2022-04-29
Payer: COMMERCIAL

## 2022-04-29 PROCEDURE — 90837 PSYTX W PT 60 MINUTES: CPT

## 2022-05-02 PROBLEM — D26.1: Chronic | Status: ACTIVE | Noted: 2022-03-30

## 2022-05-02 PROBLEM — D13.7 BENIGN NEOPLASM OF ENDOCRINE PANCREAS: Chronic | Status: ACTIVE | Noted: 2022-03-30

## 2022-05-02 PROBLEM — N84.0 POLYP OF CORPUS UTERI: Chronic | Status: ACTIVE | Noted: 2022-03-30

## 2022-05-02 PROBLEM — R45.89 OTHER SYMPTOMS AND SIGNS INVOLVING EMOTIONAL STATE: Chronic | Status: ACTIVE | Noted: 2022-03-30

## 2022-05-06 ENCOUNTER — APPOINTMENT (OUTPATIENT)
Dept: PSYCHIATRY | Facility: CLINIC | Age: 52
End: 2022-05-06
Payer: COMMERCIAL

## 2022-05-06 PROCEDURE — 90834 PSYTX W PT 45 MINUTES: CPT

## 2022-05-13 ENCOUNTER — APPOINTMENT (OUTPATIENT)
Dept: PSYCHIATRY | Facility: CLINIC | Age: 52
End: 2022-05-13
Payer: COMMERCIAL

## 2022-05-13 PROCEDURE — 90832 PSYTX W PT 30 MINUTES: CPT

## 2022-05-20 ENCOUNTER — APPOINTMENT (OUTPATIENT)
Dept: PSYCHIATRY | Facility: CLINIC | Age: 52
End: 2022-05-20

## 2022-05-27 ENCOUNTER — APPOINTMENT (OUTPATIENT)
Dept: PSYCHIATRY | Facility: CLINIC | Age: 52
End: 2022-05-27
Payer: COMMERCIAL

## 2022-05-27 PROCEDURE — 90834 PSYTX W PT 45 MINUTES: CPT | Mod: 95

## 2022-06-10 ENCOUNTER — APPOINTMENT (OUTPATIENT)
Dept: PSYCHIATRY | Facility: CLINIC | Age: 52
End: 2022-06-10
Payer: COMMERCIAL

## 2022-06-10 PROCEDURE — 90837 PSYTX W PT 60 MINUTES: CPT

## 2022-06-17 ENCOUNTER — APPOINTMENT (OUTPATIENT)
Dept: PSYCHIATRY | Facility: CLINIC | Age: 52
End: 2022-06-17
Payer: COMMERCIAL

## 2022-06-17 PROCEDURE — 90834 PSYTX W PT 45 MINUTES: CPT

## 2022-06-24 ENCOUNTER — APPOINTMENT (OUTPATIENT)
Dept: PSYCHIATRY | Facility: CLINIC | Age: 52
End: 2022-06-24
Payer: COMMERCIAL

## 2022-06-24 PROCEDURE — 90834 PSYTX W PT 45 MINUTES: CPT

## 2022-07-01 ENCOUNTER — APPOINTMENT (OUTPATIENT)
Dept: PSYCHIATRY | Facility: CLINIC | Age: 52
End: 2022-07-01

## 2022-07-01 PROCEDURE — 90837 PSYTX W PT 60 MINUTES: CPT | Mod: 95

## 2022-07-08 ENCOUNTER — APPOINTMENT (OUTPATIENT)
Dept: PSYCHIATRY | Facility: CLINIC | Age: 52
End: 2022-07-08

## 2022-07-15 ENCOUNTER — APPOINTMENT (OUTPATIENT)
Dept: PSYCHIATRY | Facility: CLINIC | Age: 52
End: 2022-07-15

## 2022-07-15 PROCEDURE — 90834 PSYTX W PT 45 MINUTES: CPT | Mod: 95

## 2022-07-22 ENCOUNTER — APPOINTMENT (OUTPATIENT)
Dept: PSYCHIATRY | Facility: CLINIC | Age: 52
End: 2022-07-22

## 2022-07-22 PROCEDURE — 90834 PSYTX W PT 45 MINUTES: CPT

## 2022-07-29 ENCOUNTER — APPOINTMENT (OUTPATIENT)
Dept: PSYCHIATRY | Facility: CLINIC | Age: 52
End: 2022-07-29

## 2022-07-29 PROCEDURE — 90834 PSYTX W PT 45 MINUTES: CPT

## 2022-08-05 ENCOUNTER — APPOINTMENT (OUTPATIENT)
Dept: PSYCHIATRY | Facility: CLINIC | Age: 52
End: 2022-08-05

## 2022-08-05 PROCEDURE — 90834 PSYTX W PT 45 MINUTES: CPT | Mod: 95

## 2022-08-12 ENCOUNTER — APPOINTMENT (OUTPATIENT)
Dept: PSYCHIATRY | Facility: CLINIC | Age: 52
End: 2022-08-12

## 2022-08-12 PROCEDURE — 90837 PSYTX W PT 60 MINUTES: CPT

## 2022-08-25 ENCOUNTER — APPOINTMENT (OUTPATIENT)
Dept: PSYCHIATRY | Facility: CLINIC | Age: 52
End: 2022-08-25

## 2022-09-02 ENCOUNTER — APPOINTMENT (OUTPATIENT)
Dept: PSYCHIATRY | Facility: CLINIC | Age: 52
End: 2022-09-02

## 2022-09-02 PROCEDURE — 90834 PSYTX W PT 45 MINUTES: CPT

## 2022-09-09 ENCOUNTER — APPOINTMENT (OUTPATIENT)
Dept: PSYCHIATRY | Facility: CLINIC | Age: 52
End: 2022-09-09

## 2022-09-16 ENCOUNTER — APPOINTMENT (OUTPATIENT)
Dept: PSYCHIATRY | Facility: CLINIC | Age: 52
End: 2022-09-16

## 2022-09-16 PROCEDURE — 90834 PSYTX W PT 45 MINUTES: CPT

## 2022-09-19 NOTE — PHYSICAL EXAM
[Cooperative] : cooperative [Euthymic] : euthymic [Full] : full [Clear] : clear [Linear/Goal Directed] : linear/goal directed [None Reported] : none reported [Average] : average [WNL] : within normal limits [Not applicable] : not applicable

## 2022-09-19 NOTE — PLAN
[FreeTextEntry2] : Goal: Pt will reduce overall level, frequency and intensity of Anxiety so that daily functioning is not impaired.\par Objective: Patient will replace negative and self-defeating self-talk with verbalization of realistic and positive cognitive messages.\par Intervention: clinician will assist pt to practice using healthy communication techniques in order to communicate her feelings and needs in order to help avoid isolation when experiencing depression or anxiety \par Intervention: Clinician will reinforce positive, reality based cognitive messages that enhance self-confidence and increase adaptive action.\par Intervention: Clinician will provide education regarding healthy communication techniques and assist client in practicing these in sessions.  [Acceptance and Commitment Therapy] : Acceptance and Commitment Therapy  [Cognitive and/or Behavior Therapy] : Cognitive and/or Behavior Therapy  [Psychodynamic Therapy] : Psychodynamic Therapy  [Psychoeducation] : Psychoeducation  [Supportive Therapy] : Supportive Therapy [de-identified] : Patient presented as oriented times three and with stable mood. No reports of SI or HI.\par Chief complaint was interpersonal conflicts and struggle with control. Psychoeducation provided on radical acceptance and letting go via CBT. Patient responded well to same and re-framing of mindset. Further focus to be on exercising same in daily living to better reinforce more desired positive interactions. Positive coping skills reviewed.

## 2022-09-19 NOTE — RISK ASSESSMENT
[No, patient denies ideation or behavior] : No, patient denies ideation or behavior [No] : No [No known risk factors] : No known risk factors

## 2022-09-21 ENCOUNTER — APPOINTMENT (OUTPATIENT)
Dept: PSYCHIATRY | Facility: CLINIC | Age: 52
End: 2022-09-21

## 2022-09-21 ENCOUNTER — APPOINTMENT (OUTPATIENT)
Dept: DERMATOLOGY | Facility: CLINIC | Age: 52
End: 2022-09-21

## 2022-09-21 VITALS — WEIGHT: 113 LBS | BODY MASS INDEX: 20.8 KG/M2 | HEIGHT: 62 IN

## 2022-09-21 PROCEDURE — 17110 DESTRUCTION B9 LES UP TO 14: CPT

## 2022-09-21 PROCEDURE — 90834 PSYTX W PT 45 MINUTES: CPT | Mod: 95

## 2022-09-21 PROCEDURE — 99213 OFFICE O/P EST LOW 20 MIN: CPT | Mod: 25

## 2022-09-29 ENCOUNTER — APPOINTMENT (OUTPATIENT)
Dept: PSYCHIATRY | Facility: CLINIC | Age: 52
End: 2022-09-29

## 2022-09-29 PROCEDURE — 90837 PSYTX W PT 60 MINUTES: CPT

## 2022-10-07 ENCOUNTER — APPOINTMENT (OUTPATIENT)
Dept: PSYCHIATRY | Facility: CLINIC | Age: 52
End: 2022-10-07

## 2022-10-07 PROCEDURE — 90834 PSYTX W PT 45 MINUTES: CPT

## 2022-10-07 NOTE — RISK ASSESSMENT
[No, patient denies ideation or behavior] : No, patient denies ideation or behavior [No] : No [FreeTextEntry8] : Denies [FreeTextEntry7] : denies

## 2022-10-07 NOTE — END OF VISIT
[Individual Psychotherapy for 38-52 minutes] : Individual Psychotherapy for 38 - 52 minutes [Teletherapy Service Provided] : The services provided in this session were delivered via tele-therapy [FreeTextEntry3] : Primary residence [FreeTextEntry5] : Office
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
## 2022-10-07 NOTE — PHYSICAL EXAM
[Cooperative] : cooperative [Euthymic] : euthymic [Full] : full [Clear] : clear [Linear/Goal Directed] : linear/goal directed [WNL] : within normal limits

## 2022-10-12 ENCOUNTER — APPOINTMENT (OUTPATIENT)
Dept: PSYCHIATRY | Facility: CLINIC | Age: 52
End: 2022-10-12

## 2022-10-12 PROCEDURE — 90837 PSYTX W PT 60 MINUTES: CPT
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
## 2022-10-21 ENCOUNTER — APPOINTMENT (OUTPATIENT)
Dept: PSYCHIATRY | Facility: CLINIC | Age: 52
End: 2022-10-21

## 2022-10-21 PROCEDURE — 90837 PSYTX W PT 60 MINUTES: CPT | Mod: 95
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
## 2022-10-27 ENCOUNTER — APPOINTMENT (OUTPATIENT)
Dept: PSYCHIATRY | Facility: CLINIC | Age: 52
End: 2022-10-27

## 2022-10-27 PROCEDURE — 90837 PSYTX W PT 60 MINUTES: CPT
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
## 2022-11-03 ENCOUNTER — APPOINTMENT (OUTPATIENT)
Dept: PSYCHIATRY | Facility: CLINIC | Age: 52
End: 2022-11-03

## 2022-11-03 PROCEDURE — 90837 PSYTX W PT 60 MINUTES: CPT
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
## 2022-11-10 ENCOUNTER — APPOINTMENT (OUTPATIENT)
Dept: PSYCHIATRY | Facility: CLINIC | Age: 52
End: 2022-11-10

## 2022-11-10 PROCEDURE — 90837 PSYTX W PT 60 MINUTES: CPT
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
## 2022-11-17 ENCOUNTER — APPOINTMENT (OUTPATIENT)
Dept: PSYCHIATRY | Facility: CLINIC | Age: 52
End: 2022-11-17

## 2022-11-17 PROCEDURE — 90837 PSYTX W PT 60 MINUTES: CPT | Mod: 95

## 2022-11-19 NOTE — END OF VISIT
[Individual Psychotherapy for 53+ minutes] : Individual Psychotherapy for 53+ minutes  [Teletherapy Service Provided] : The services provided in this session were delivered via tele-therapy [FreeTextEntry3] : Work Office  [FreeTextEntry5] : Remote
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
## 2022-11-29 ENCOUNTER — APPOINTMENT (OUTPATIENT)
Dept: PSYCHIATRY | Facility: CLINIC | Age: 52
End: 2022-11-29

## 2022-11-29 PROCEDURE — 90834 PSYTX W PT 45 MINUTES: CPT | Mod: 95
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
## 2022-12-08 ENCOUNTER — APPOINTMENT (OUTPATIENT)
Dept: PSYCHIATRY | Facility: CLINIC | Age: 52
End: 2022-12-08

## 2022-12-08 PROCEDURE — 90837 PSYTX W PT 60 MINUTES: CPT
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
## 2022-12-14 ENCOUNTER — APPOINTMENT (OUTPATIENT)
Dept: PSYCHIATRY | Facility: CLINIC | Age: 52
End: 2022-12-14

## 2022-12-14 PROCEDURE — 90837 PSYTX W PT 60 MINUTES: CPT

## 2022-12-16 NOTE — PLAN
[FreeTextEntry2] : Goal: Pt will reduce overall level, frequency and intensity of anxiety so that daily functioning is not impaired\par Objective: Pt will replace negative and self-defeating self-talk with verbalization of realistic and positive cognitive messages.\par Intervention: Clinician will assist pt to practice using healthy communication techniques in order to communicate her feelings and needs in order to help avoid isolating when experiencing depression or anxiety\par Intervention: Clinician will reinforce positive, reality based cognitive messages that enhance self-confidence and increase adaptive action\par Intervention: Clinician will provide education regarding healthy communication techniques and assist client in practicing these in sessions. \par \par \par  [Acceptance and Commitment Therapy] : Acceptance and Commitment Therapy  [Cognitive and/or Behavior Therapy] : Cognitive and/or Behavior Therapy  [Eye Movement Desensitization and Reprocessing Therapy (EMDR)] : Eye Movement Desensitization and Reprocessing Therapy (EMDR) [Psychodynamic Therapy] : Psychodynamic Therapy  [Psychoeducation] : Psychoeducation  [Skills training (all types)] : Skills training (all types)  [Supportive Therapy] : Supportive Therapy [de-identified] : Patient presented as oriented times three and with stable mood. No Si or HI reported.\par Session focus was on letting go with things she can't control and helping her children establish more accountability and learn to better advocate for selves where safe and possible within family dynamics to address issues and concern. Clinician and patient worked on healthy conflict resolution skills for holidays and boundary setting. Supportive therapy provided. \par Session was virtual via teledoc. Patient consented to same.  [FreeTextEntry1] : Patient is to continue to attend weekly psychotherapy sessions to focus in on chief complaints until goals/adaptive desired functioning is reached.

## 2022-12-21 ENCOUNTER — APPOINTMENT (OUTPATIENT)
Dept: DERMATOLOGY | Facility: CLINIC | Age: 52
End: 2022-12-21
Payer: COMMERCIAL

## 2022-12-21 ENCOUNTER — APPOINTMENT (OUTPATIENT)
Dept: PSYCHIATRY | Facility: CLINIC | Age: 52
End: 2022-12-21

## 2022-12-21 DIAGNOSIS — L82.0 INFLAMED SEBORRHEIC KERATOSIS: ICD-10-CM

## 2022-12-21 PROCEDURE — 99213 OFFICE O/P EST LOW 20 MIN: CPT | Mod: 25

## 2022-12-21 PROCEDURE — 90837 PSYTX W PT 60 MINUTES: CPT

## 2022-12-21 PROCEDURE — 17110 DESTRUCTION B9 LES UP TO 14: CPT | Mod: 59

## 2022-12-21 PROCEDURE — 17003 DESTRUCT PREMALG LES 2-14: CPT | Mod: 59

## 2022-12-21 PROCEDURE — 17000 DESTRUCT PREMALG LESION: CPT | Mod: 59
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
## 2022-12-29 ENCOUNTER — APPOINTMENT (OUTPATIENT)
Dept: PSYCHIATRY | Facility: CLINIC | Age: 52
End: 2022-12-29

## 2022-12-29 PROCEDURE — 90837 PSYTX W PT 60 MINUTES: CPT
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
## 2023-01-06 ENCOUNTER — APPOINTMENT (OUTPATIENT)
Dept: PSYCHIATRY | Facility: CLINIC | Age: 53
End: 2023-01-06
Payer: COMMERCIAL

## 2023-01-06 PROCEDURE — 90837 PSYTX W PT 60 MINUTES: CPT
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
## 2023-01-13 ENCOUNTER — APPOINTMENT (OUTPATIENT)
Dept: PSYCHIATRY | Facility: CLINIC | Age: 53
End: 2023-01-13
Payer: COMMERCIAL

## 2023-01-13 PROCEDURE — 90837 PSYTX W PT 60 MINUTES: CPT
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
## 2023-01-20 ENCOUNTER — APPOINTMENT (OUTPATIENT)
Dept: PSYCHIATRY | Facility: CLINIC | Age: 53
End: 2023-01-20
Payer: COMMERCIAL

## 2023-01-20 PROCEDURE — 90837 PSYTX W PT 60 MINUTES: CPT
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
## 2023-01-25 ENCOUNTER — APPOINTMENT (OUTPATIENT)
Dept: PSYCHIATRY | Facility: CLINIC | Age: 53
End: 2023-01-25
Payer: COMMERCIAL

## 2023-01-25 PROCEDURE — 90837 PSYTX W PT 60 MINUTES: CPT
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
## 2023-02-02 ENCOUNTER — APPOINTMENT (OUTPATIENT)
Dept: PSYCHIATRY | Facility: CLINIC | Age: 53
End: 2023-02-02
Payer: COMMERCIAL

## 2023-02-02 PROCEDURE — 90837 PSYTX W PT 60 MINUTES: CPT

## 2023-02-03 NOTE — PLAN
[FreeTextEntry2] : Goal: Pt will reduce overall level, frequency and intensity of anxiety so that daily functioning is not impaired\par Objective: Pt will replace negative and self-defeating self-talk with verbalization of realistic and positive cognitive messages.\par Intervention: Clinician will assist pt to practice using healthy communication techniques in order to communicate her feelings and needs in order to help avoid isolating when experiencing depression or anxiety\par Intervention: Clinician will reinforce positive, reality based cognitive messages that enhance self-confidence and increase adaptive action\par Intervention: Clinician will provide education regarding healthy communication techniques and assist client in practicing these in sessions. \par \par \par  [Acceptance and Commitment Therapy] : Acceptance and Commitment Therapy  [Cognitive and/or Behavior Therapy] : Cognitive and/or Behavior Therapy  [Eye Movement Desensitization and Reprocessing Therapy (EMDR)] : Eye Movement Desensitization and Reprocessing Therapy (EMDR) [Psychodynamic Therapy] : Psychodynamic Therapy  [Psychoeducation] : Psychoeducation  [Skills training (all types)] : Skills training (all types)  [Supportive Therapy] : Supportive Therapy [de-identified] : Patient presented as oriented times three and with stable mood. No Si or HI reported.\par Patient struggled with mindfulness coping skills, and came to realization of need to soothe self and find inner voice. Therapy focus was encompassing acceptance, letting go, and re-framing best ways to live for self. Positive coping skills reviewed. [FreeTextEntry1] : Patient is to continue to attend weekly psychotherapy sessions to focus in on chief complaints until goals/adaptive desired functioning is reached.

## 2023-02-10 ENCOUNTER — APPOINTMENT (OUTPATIENT)
Dept: PSYCHIATRY | Facility: CLINIC | Age: 53
End: 2023-02-10
Payer: COMMERCIAL

## 2023-02-10 PROCEDURE — 90834 PSYTX W PT 45 MINUTES: CPT
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
## 2023-02-16 ENCOUNTER — APPOINTMENT (OUTPATIENT)
Dept: PSYCHIATRY | Facility: CLINIC | Age: 53
End: 2023-02-16
Payer: COMMERCIAL

## 2023-02-16 PROCEDURE — 90834 PSYTX W PT 45 MINUTES: CPT
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
## 2023-03-02 ENCOUNTER — APPOINTMENT (OUTPATIENT)
Dept: PSYCHIATRY | Facility: CLINIC | Age: 53
End: 2023-03-02
Payer: COMMERCIAL

## 2023-03-02 PROCEDURE — 90837 PSYTX W PT 60 MINUTES: CPT | Mod: 95

## 2023-03-06 ENCOUNTER — APPOINTMENT (OUTPATIENT)
Dept: ULTRASOUND IMAGING | Facility: CLINIC | Age: 53
End: 2023-03-06
Payer: COMMERCIAL

## 2023-03-06 ENCOUNTER — APPOINTMENT (OUTPATIENT)
Dept: MAMMOGRAPHY | Facility: CLINIC | Age: 53
End: 2023-03-06
Payer: COMMERCIAL

## 2023-03-06 ENCOUNTER — OUTPATIENT (OUTPATIENT)
Dept: OUTPATIENT SERVICES | Facility: HOSPITAL | Age: 53
LOS: 1 days | End: 2023-03-06
Payer: COMMERCIAL

## 2023-03-06 DIAGNOSIS — Z00.8 ENCOUNTER FOR OTHER GENERAL EXAMINATION: ICD-10-CM

## 2023-03-06 DIAGNOSIS — N60.19 DIFFUSE CYSTIC MASTOPATHY OF UNSPECIFIED BREAST: ICD-10-CM

## 2023-03-06 DIAGNOSIS — E04.9 NONTOXIC GOITER, UNSPECIFIED: ICD-10-CM

## 2023-03-06 DIAGNOSIS — Z90.411 ACQUIRED PARTIAL ABSENCE OF PANCREAS: Chronic | ICD-10-CM

## 2023-03-06 DIAGNOSIS — Z12.31 ENCOUNTER FOR SCREENING MAMMOGRAM FOR MALIGNANT NEOPLASM OF BREAST: ICD-10-CM

## 2023-03-06 PROCEDURE — 76641 ULTRASOUND BREAST COMPLETE: CPT

## 2023-03-06 PROCEDURE — 77063 BREAST TOMOSYNTHESIS BI: CPT

## 2023-03-06 PROCEDURE — 76641 ULTRASOUND BREAST COMPLETE: CPT | Mod: 26,50

## 2023-03-06 PROCEDURE — 76536 US EXAM OF HEAD AND NECK: CPT | Mod: 26

## 2023-03-06 PROCEDURE — 77063 BREAST TOMOSYNTHESIS BI: CPT | Mod: 26

## 2023-03-06 PROCEDURE — 77067 SCR MAMMO BI INCL CAD: CPT | Mod: 26

## 2023-03-06 PROCEDURE — 76536 US EXAM OF HEAD AND NECK: CPT

## 2023-03-06 PROCEDURE — 77067 SCR MAMMO BI INCL CAD: CPT
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
## 2023-03-10 ENCOUNTER — APPOINTMENT (OUTPATIENT)
Dept: PSYCHIATRY | Facility: CLINIC | Age: 53
End: 2023-03-10
Payer: COMMERCIAL

## 2023-03-10 PROCEDURE — 90837 PSYTX W PT 60 MINUTES: CPT | Mod: 95
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
## 2023-03-17 ENCOUNTER — APPOINTMENT (OUTPATIENT)
Dept: PSYCHIATRY | Facility: CLINIC | Age: 53
End: 2023-03-17
Payer: COMMERCIAL

## 2023-03-17 PROCEDURE — 90837 PSYTX W PT 60 MINUTES: CPT | Mod: 95

## 2023-03-21 NOTE — END OF VISIT
[Individual Psychotherapy for 53+ minutes] : Individual Psychotherapy for 53+ minutes  [Teletherapy Service Provided] : The services provided in this session were delivered via tele-therapy [FreeTextEntry3] : Work office [FreeTextEntry5] :  office
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
## 2023-03-23 ENCOUNTER — APPOINTMENT (OUTPATIENT)
Dept: PSYCHIATRY | Facility: CLINIC | Age: 53
End: 2023-03-23
Payer: COMMERCIAL

## 2023-03-23 PROCEDURE — 90837 PSYTX W PT 60 MINUTES: CPT | Mod: 95
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
## 2023-03-30 ENCOUNTER — APPOINTMENT (OUTPATIENT)
Dept: PSYCHIATRY | Facility: CLINIC | Age: 53
End: 2023-03-30
Payer: COMMERCIAL

## 2023-03-30 PROCEDURE — 90837 PSYTX W PT 60 MINUTES: CPT
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
## 2023-04-06 ENCOUNTER — APPOINTMENT (OUTPATIENT)
Dept: PSYCHIATRY | Facility: CLINIC | Age: 53
End: 2023-04-06
Payer: COMMERCIAL

## 2023-04-06 PROCEDURE — 90837 PSYTX W PT 60 MINUTES: CPT | Mod: 95
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
## 2023-04-13 ENCOUNTER — APPOINTMENT (OUTPATIENT)
Dept: PSYCHIATRY | Facility: CLINIC | Age: 53
End: 2023-04-13
Payer: COMMERCIAL

## 2023-04-13 PROCEDURE — 90837 PSYTX W PT 60 MINUTES: CPT | Mod: 95
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
## 2023-04-20 ENCOUNTER — APPOINTMENT (OUTPATIENT)
Dept: PSYCHIATRY | Facility: CLINIC | Age: 53
End: 2023-04-20
Payer: COMMERCIAL

## 2023-04-20 PROCEDURE — 90837 PSYTX W PT 60 MINUTES: CPT | Mod: 95

## 2023-04-21 NOTE — PLAN
[FreeTextEntry2] : Goal: Pt will reduce overall level, frequency and intensity of anxiety so that daily functioning is not impaired\par Objective: Pt will replace negative and self-defeating self-talk with verbalization of realistic and positive cognitive messages.\par Intervention: Clinician will assist pt to practice using healthy communication techniques in order to communicate her feelings and needs in order to help avoid isolating when experiencing depression or anxiety\par Intervention: Clinician will reinforce positive, reality based cognitive messages that enhance self-confidence and increase adaptive action\par Intervention: Clinician will provide education regarding healthy communication techniques and assist client in practicing these in sessions. \par \par \par  [Acceptance and Commitment Therapy] : Acceptance and Commitment Therapy  [Cognitive and/or Behavior Therapy] : Cognitive and/or Behavior Therapy  [Eye Movement Desensitization and Reprocessing Therapy (EMDR)] : Eye Movement Desensitization and Reprocessing Therapy (EMDR) [Psychodynamic Therapy] : Psychodynamic Therapy  [Psychoeducation] : Psychoeducation  [Skills training (all types)] : Skills training (all types)  [Supportive Therapy] : Supportive Therapy [de-identified] : Patient presented as oriented times three and with stable mood. No Si or HI reported.\par Session focus was on following conflict resolution relapse prevention plan when managing unruly and unhealthy behaviors with . Supportive therapy provided with emotional support and validation, with psychoeducation on continued healthy boundary settings. \par Session was virtual today. Had to switch to phone due to telehealth failure.  [FreeTextEntry1] : Patient is to continue to attend weekly psychotherapy sessions to focus in on chief complaints until goals/adaptive desired functioning is reached.

## 2023-04-25 ENCOUNTER — APPOINTMENT (OUTPATIENT)
Dept: SURGERY | Facility: CLINIC | Age: 53
End: 2023-04-25
Payer: COMMERCIAL

## 2023-04-25 VITALS — WEIGHT: 115 LBS | HEIGHT: 62 IN | BODY MASS INDEX: 21.16 KG/M2

## 2023-04-25 DIAGNOSIS — Z86.018 PERSONAL HISTORY OF OTHER BENIGN NEOPLASM: ICD-10-CM

## 2023-04-25 PROCEDURE — 99203 OFFICE O/P NEW LOW 30 MIN: CPT

## 2023-04-26 PROBLEM — Z86.018 HISTORY OF INSULINOMA: Status: RESOLVED | Noted: 2023-04-26 | Resolved: 2023-04-26

## 2023-04-26 NOTE — HISTORY OF PRESENT ILLNESS
[de-identified] : Patient referred by Dr. Reveles for evaluation of multinodular goiter.  Patient noted to have thyroid nodules in 2019.  Biopsies at [facility] of left lower nodule 1.8 cm benign.  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  Patient denies dysphagia, change in voice or radiation exposure.  Thyroid ultrasound March 2023: Right lobe 5.1 x 1.4 x 2 cm with multiple nodules, largest midpole 13 x 5 x 10 mm.  11 mm lower pole cyst.  Left lobe 5.1 x 1.3 x 1.7 cm with lower hypoechoic nodule 19 x 9 x 14 mm.  Upper 5 mm and mid 6 mm nodules.  No abnormal lymph nodes.  I have reviewed all old and new data and available images.

## 2023-04-26 NOTE — ASSESSMENT
[FreeTextEntry1] : Patient with history of multinodular goiter with prior biopsy of dominant nodule benign according to patient.  Report not available.  No significant change in size on recent ultrasound.  I have instructed my office to obtain biopsy report for review.  If confirmed benign, follow-up ultrasound September 2023, RTO 6 months.  If nodule enlarges, a biopsy will be performed at that time.  I have answered their questions to the best of my ability.\par

## 2023-04-26 NOTE — PHYSICAL EXAM
[de-identified] : no cervical or supraclavicular adenopathy, trachea midline, thyroid without enlargement or palpable mass [Normal] : no neck adenopathy [de-identified] : Skin:  normal appearance.  no rash, nodules, vesicles, or erythema,\par Musculoskeletal:  full range of motion and no deformities appreciated\par Neurological:  grossly intact\par Psychiatric:  oriented to person, place and time with appropriate affect

## 2023-04-26 NOTE — CONSULT LETTER
[Dear  ___] : Dear  [unfilled], [Consult Letter:] : I had the pleasure of evaluating your patient, [unfilled]. [Please see my note below.] : Please see my note below. [Consult Closing:] : Thank you very much for allowing me to participate in the care of this patient.  If you have any questions, please do not hesitate to contact me. [Sincerely,] : Sincerely, [FreeTextEntry2] : Dr. Yadira Reveles [FreeTextEntry3] : Marija Vera MD, FACS\par Assistant Professor of Surgery and Otolaryngology\par [facility]\par

## 2023-04-27 ENCOUNTER — APPOINTMENT (OUTPATIENT)
Dept: PSYCHIATRY | Facility: CLINIC | Age: 53
End: 2023-04-27
Payer: COMMERCIAL

## 2023-04-27 PROCEDURE — 90837 PSYTX W PT 60 MINUTES: CPT | Mod: 95

## 2023-04-28 NOTE — END OF VISIT
[Individual Psychotherapy for 53+ minutes] : Individual Psychotherapy for 53+ minutes  [Teletherapy Service Provided] : The services provided in this session were delivered via tele-therapy [FreeTextEntry5] :  office [FreeTextEntry3] : Work office
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
## 2023-05-04 ENCOUNTER — APPOINTMENT (OUTPATIENT)
Dept: PSYCHIATRY | Facility: CLINIC | Age: 53
End: 2023-05-04
Payer: COMMERCIAL

## 2023-05-04 PROCEDURE — 90837 PSYTX W PT 60 MINUTES: CPT | Mod: 95
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
## 2023-05-12 ENCOUNTER — APPOINTMENT (OUTPATIENT)
Dept: PSYCHIATRY | Facility: CLINIC | Age: 53
End: 2023-05-12
Payer: COMMERCIAL

## 2023-05-12 PROCEDURE — 90834 PSYTX W PT 45 MINUTES: CPT | Mod: 95

## 2023-05-15 NOTE — RISK ASSESSMENT
[No, patient denies ideation or behavior] : No, patient denies ideation or behavior [FreeTextEntry8] : Denies [No] : No [FreeTextEntry7] : denies

## 2023-05-15 NOTE — END OF VISIT
[Individual Psychotherapy for 38-52 minutes] : Individual Psychotherapy for 38 - 52 minutes [Teletherapy Service Provided] : The services provided in this session were delivered via tele-therapy [FreeTextEntry3] : Work office [FreeTextEntry5] :  office
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
## 2023-05-18 ENCOUNTER — APPOINTMENT (OUTPATIENT)
Dept: PSYCHIATRY | Facility: CLINIC | Age: 53
End: 2023-05-18
Payer: COMMERCIAL

## 2023-05-18 PROCEDURE — 90837 PSYTX W PT 60 MINUTES: CPT | Mod: 95

## 2023-05-18 NOTE — PLAN
[FreeTextEntry2] : Goal: Pt will reduce overall level, frequency and intensity of anxiety so that daily functioning is not impaired\par Objective: Pt will replace negative and self-defeating self-talk with verbalization of realistic and positive cognitive messages.\par Intervention: Clinician will assist pt to practice using healthy communication techniques in order to communicate her feelings and needs in order to help avoid isolating when experiencing depression or anxiety\par Intervention: Clinician will reinforce positive, reality based cognitive messages that enhance self-confidence and increase adaptive action\par Intervention: Clinician will provide education regarding healthy communication techniques and assist client in practicing these in sessions. \par \par \par  [Acceptance and Commitment Therapy] : Acceptance and Commitment Therapy  [Cognitive and/or Behavior Therapy] : Cognitive and/or Behavior Therapy  [Eye Movement Desensitization and Reprocessing Therapy (EMDR)] : Eye Movement Desensitization and Reprocessing Therapy (EMDR) [Psychodynamic Therapy] : Psychodynamic Therapy  [Psychoeducation] : Psychoeducation  [Skills training (all types)] : Skills training (all types)  [Supportive Therapy] : Supportive Therapy [de-identified] : Patient presented as oriented times three and with stable mood. No Si or HI reported.\par Session focus was on learning to let go and learn how to move with flow amongst negativity and negative messaging from ex. Supportive therapy provided with emotional support and validation. Clinician utilized strengths based perspective to help patient strengthen positive self-confidence for self. \par Session was virtual today. Had to switch to phone due to telehealth failure.  [FreeTextEntry1] : Patient is to continue to attend weekly psychotherapy sessions to focus in on chief complaints until goals/adaptive desired functioning is reached.

## 2023-05-25 ENCOUNTER — APPOINTMENT (OUTPATIENT)
Dept: PSYCHIATRY | Facility: CLINIC | Age: 53
End: 2023-05-25
Payer: COMMERCIAL

## 2023-05-25 PROCEDURE — 90837 PSYTX W PT 60 MINUTES: CPT | Mod: 95
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
## 2023-06-01 ENCOUNTER — APPOINTMENT (OUTPATIENT)
Dept: PSYCHIATRY | Facility: CLINIC | Age: 53
End: 2023-06-01

## 2023-06-08 ENCOUNTER — APPOINTMENT (OUTPATIENT)
Dept: PSYCHIATRY | Facility: CLINIC | Age: 53
End: 2023-06-08
Payer: COMMERCIAL

## 2023-06-08 PROCEDURE — 90837 PSYTX W PT 60 MINUTES: CPT | Mod: 95

## 2023-06-09 NOTE — PLAN
[FreeTextEntry2] : Goal: Pt will reduce overall level, frequency and intensity of anxiety so that daily functioning is not impaired\par Objective: Pt will replace negative and self-defeating self-talk with verbalization of realistic and positive cognitive messages.\par Intervention: Clinician will assist pt to practice using healthy communication techniques in order to communicate her feelings and needs in order to help avoid isolating when experiencing depression or anxiety\par Intervention: Clinician will reinforce positive, reality based cognitive messages that enhance self-confidence and increase adaptive action\par Intervention: Clinician will provide education regarding healthy communication techniques and assist client in practicing these in sessions. \par \par \par  [Acceptance and Commitment Therapy] : Acceptance and Commitment Therapy  [Cognitive and/or Behavior Therapy] : Cognitive and/or Behavior Therapy  [Eye Movement Desensitization and Reprocessing Therapy (EMDR)] : Eye Movement Desensitization and Reprocessing Therapy (EMDR) [Psychodynamic Therapy] : Psychodynamic Therapy  [Psychoeducation] : Psychoeducation  [Skills training (all types)] : Skills training (all types)  [Supportive Therapy] : Supportive Therapy [de-identified] : Patient presented as oriented times three and with stable mood. No Si or HI reported.\par Session focus on letting go, and mindfulness and best ways to meet stressors and conflicts as they arise. Supportive therapy provided with emotional support and validation. \par Session was virtual today. Had to switch to phone due to telehealth failure.  [FreeTextEntry1] : Patient is to continue to attend weekly psychotherapy sessions to focus in on chief complaints until goals/adaptive desired functioning is reached.

## 2023-06-13 ENCOUNTER — NON-APPOINTMENT (OUTPATIENT)
Age: 53
End: 2023-06-13

## 2023-06-13 ENCOUNTER — APPOINTMENT (OUTPATIENT)
Dept: PSYCHIATRY | Facility: CLINIC | Age: 53
End: 2023-06-13

## 2023-06-14 ENCOUNTER — APPOINTMENT (OUTPATIENT)
Dept: DERMATOLOGY | Facility: CLINIC | Age: 53
End: 2023-06-14
Payer: COMMERCIAL

## 2023-06-14 DIAGNOSIS — T14.8XXA OTHER INJURY OF UNSPECIFIED BODY REGION, INITIAL ENCOUNTER: ICD-10-CM

## 2023-06-14 PROCEDURE — 11102 TANGNTL BX SKIN SINGLE LES: CPT

## 2023-06-14 PROCEDURE — 99212 OFFICE O/P EST SF 10 MIN: CPT | Mod: 25

## 2023-06-23 ENCOUNTER — NON-APPOINTMENT (OUTPATIENT)
Age: 53
End: 2023-06-23

## 2023-06-29 ENCOUNTER — APPOINTMENT (OUTPATIENT)
Dept: PSYCHIATRY | Facility: CLINIC | Age: 53
End: 2023-06-29
Payer: COMMERCIAL

## 2023-06-29 LAB — DERMATOLOGY BIOPSY: NORMAL

## 2023-06-29 PROCEDURE — 90837 PSYTX W PT 60 MINUTES: CPT | Mod: 95
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
## 2023-07-06 ENCOUNTER — APPOINTMENT (OUTPATIENT)
Dept: PSYCHIATRY | Facility: CLINIC | Age: 53
End: 2023-07-06
Payer: COMMERCIAL

## 2023-07-06 PROCEDURE — 90837 PSYTX W PT 60 MINUTES: CPT | Mod: 95

## 2023-07-07 NOTE — PLAN
[FreeTextEntry2] : Goal: Pt will reduce overall level, frequency and intensity of anxiety so that daily functioning is not impaired\par Objective: Pt will replace negative and self-defeating self-talk with verbalization of realistic and positive cognitive messages.\par Intervention: Clinician will assist pt to practice using healthy communication techniques in order to communicate her feelings and needs in order to help avoid isolating when experiencing depression or anxiety\par Intervention: Clinician will reinforce positive, reality based cognitive messages that enhance self-confidence and increase adaptive action\par Intervention: Clinician will provide education regarding healthy communication techniques and assist client in practicing these in sessions. \par \par \par  [Acceptance and Commitment Therapy] : Acceptance and Commitment Therapy  [Cognitive and/or Behavior Therapy] : Cognitive and/or Behavior Therapy  [Eye Movement Desensitization and Reprocessing Therapy (EMDR)] : Eye Movement Desensitization and Reprocessing Therapy (EMDR) [Psychodynamic Therapy] : Psychodynamic Therapy  [Psychoeducation] : Psychoeducation  [Skills training (all types)] : Skills training (all types)  [Supportive Therapy] : Supportive Therapy [de-identified] : Patient presented as oriented times three and with stable mood. No Si or HI reported.\par Session focus on setting healthy boundaries for self and with family and learning new ways to assert needs and wants. Pt was responsive to same and understood possible benefits of this. Role playing utilized to practice new forms of healthy communication. Letting go again discussed and moving away from controlling all things and finding acceptance in areas where new control can be found that is healthier for self and relationships. \par Session was virtual today.  [FreeTextEntry1] : Patient is to continue to attend weekly psychotherapy sessions to focus in on chief complaints until goals/adaptive desired functioning is reached.

## 2023-07-13 ENCOUNTER — APPOINTMENT (OUTPATIENT)
Dept: PSYCHIATRY | Facility: CLINIC | Age: 53
End: 2023-07-13
Payer: COMMERCIAL

## 2023-07-13 PROCEDURE — 90837 PSYTX W PT 60 MINUTES: CPT | Mod: 95

## 2023-07-14 NOTE — PLAN
[FreeTextEntry2] : Goal: Pt will reduce overall level, frequency and intensity of anxiety so that daily functioning is not impaired\par Objective: Pt will replace negative and self-defeating self-talk with verbalization of realistic and positive cognitive messages.\par Intervention: Clinician will assist pt to practice using healthy communication techniques in order to communicate her feelings and needs in order to help avoid isolating when experiencing depression or anxiety\par Intervention: Clinician will reinforce positive, reality based cognitive messages that enhance self-confidence and increase adaptive action\par Intervention: Clinician will provide education regarding healthy communication techniques and assist client in practicing these in sessions. \par \par \par  [Acceptance and Commitment Therapy] : Acceptance and Commitment Therapy  [Cognitive and/or Behavior Therapy] : Cognitive and/or Behavior Therapy  [Eye Movement Desensitization and Reprocessing Therapy (EMDR)] : Eye Movement Desensitization and Reprocessing Therapy (EMDR) [Psychodynamic Therapy] : Psychodynamic Therapy  [Psychoeducation] : Psychoeducation  [Skills training (all types)] : Skills training (all types)  [Supportive Therapy] : Supportive Therapy [de-identified] : Patient presented as oriented times three and with stable mood. No Si or HI reported.\par Mindfulness practiced today in session to better cope with stressors and challenges patient is struggling with. Further work to continue in this area. [facility] provided supportive therapy and emotional support as well. \par Session was virtual today.  [FreeTextEntry1] : Patient is to continue to attend weekly psychotherapy sessions to focus in on chief complaints until goals/adaptive desired functioning is reached.

## 2023-07-20 ENCOUNTER — APPOINTMENT (OUTPATIENT)
Dept: PSYCHIATRY | Facility: CLINIC | Age: 53
End: 2023-07-20
Payer: COMMERCIAL

## 2023-07-20 PROCEDURE — 90832 PSYTX W PT 30 MINUTES: CPT | Mod: 95

## 2023-07-21 NOTE — PLAN
[FreeTextEntry2] : Goal: Pt will reduce overall level, frequency and intensity of anxiety so that daily functioning is not impaired\par Objective: Pt will replace negative and self-defeating self-talk with verbalization of realistic and positive cognitive messages.\par Intervention: Clinician will assist pt to practice using healthy communication techniques in order to communicate her feelings and needs in order to help avoid isolating when experiencing depression or anxiety\par Intervention: Clinician will reinforce positive, reality based cognitive messages that enhance self-confidence and increase adaptive action\par Intervention: Clinician will provide education regarding healthy communication techniques and assist client in practicing these in sessions. \par \par \par  [Acceptance and Commitment Therapy] : Acceptance and Commitment Therapy  [Eye Movement Desensitization and Reprocessing Therapy (EMDR)] : Eye Movement Desensitization and Reprocessing Therapy (EMDR) [Cognitive and/or Behavior Therapy] : Cognitive and/or Behavior Therapy  [Psychodynamic Therapy] : Psychodynamic Therapy  [Psychoeducation] : Psychoeducation  [Skills training (all types)] : Skills training (all types)  [Supportive Therapy] : Supportive Therapy [de-identified] : Patient presented as oriented times three and with stable mood. No Si or HI reported.\par Session shorter today due to client having a work meeting. Client shared learning to let go of old behaviors in relationships to ground self. Client commended on healthy boundaries in unhealthy relationships and encouraged to continue to set healthy boundaries for self and others to feel more stable and at peace. Further work to continue with re-framing of negative narrative of self and installing more positive self-talk. \par Session was virtual today.  [FreeTextEntry1] : Patient is to continue to attend weekly psychotherapy sessions to focus in on chief complaints until goals/adaptive desired functioning is reached.

## 2023-07-21 NOTE — END OF VISIT
[Individual Psychotherapy for 16-37 minutes] : Individual Psychotherapy for 16-37 minutes [Teletherapy Service Provided] : The services provided in this session were delivered via tele-therapy [FreeTextEntry3] : Work office [FreeTextEntry5] :  office

## 2023-07-24 ENCOUNTER — APPOINTMENT (OUTPATIENT)
Dept: PSYCHIATRY | Facility: CLINIC | Age: 53
End: 2023-07-24
Payer: COMMERCIAL

## 2023-07-24 PROCEDURE — 90837 PSYTX W PT 60 MINUTES: CPT | Mod: 95

## 2023-07-25 NOTE — PLAN
[FreeTextEntry2] : Goal: Pt will reduce overall level, frequency and intensity of anxiety so that daily functioning is not impaired\par Objective: Pt will replace negative and self-defeating self-talk with verbalization of realistic and positive cognitive messages.\par Intervention: Clinician will assist pt to practice using healthy communication techniques in order to communicate her feelings and needs in order to help avoid isolating when experiencing depression or anxiety\par Intervention: Clinician will reinforce positive, reality based cognitive messages that enhance self-confidence and increase adaptive action\par Intervention: Clinician will provide education regarding healthy communication techniques and assist client in practicing these in sessions. \par \par \par  [Acceptance and Commitment Therapy] : Acceptance and Commitment Therapy  [Cognitive and/or Behavior Therapy] : Cognitive and/or Behavior Therapy  [Eye Movement Desensitization and Reprocessing Therapy (EMDR)] : Eye Movement Desensitization and Reprocessing Therapy (EMDR) [Psychodynamic Therapy] : Psychodynamic Therapy  [Psychoeducation] : Psychoeducation  [Skills training (all types)] : Skills training (all types)  [Supportive Therapy] : Supportive Therapy [de-identified] : Patient presented as oriented times three and with stable mood. No Si or HI reported.\par Client shared further practicing radical acceptance and learning to better meet moment in here and now and letting go of what she can't control. Client shared struggling with letting go, but realizing these are areas she simply can't control. Supportive therapy provided with emotional support and validation via CBT re-framing to help foster healthier perspective on self and see possible gains to be made via new perspective in putting self first where possible. \par Session was virtual today.  [FreeTextEntry1] : Patient is to continue to attend weekly psychotherapy sessions to focus in on chief complaints until goals/adaptive desired functioning is reached.

## 2023-08-03 ENCOUNTER — APPOINTMENT (OUTPATIENT)
Dept: PSYCHIATRY | Facility: CLINIC | Age: 53
End: 2023-08-03

## 2023-08-04 ENCOUNTER — NON-APPOINTMENT (OUTPATIENT)
Age: 53
End: 2023-08-04

## 2023-08-07 ENCOUNTER — NON-APPOINTMENT (OUTPATIENT)
Age: 53
End: 2023-08-07

## 2023-08-07 ENCOUNTER — APPOINTMENT (OUTPATIENT)
Dept: DERMATOLOGY | Facility: CLINIC | Age: 53
End: 2023-08-07
Payer: COMMERCIAL

## 2023-08-07 PROCEDURE — 17313 MOHS 1 STAGE T/A/L: CPT

## 2023-08-07 PROCEDURE — 12032 INTMD RPR S/A/T/EXT 2.6-7.5: CPT

## 2023-08-10 ENCOUNTER — APPOINTMENT (OUTPATIENT)
Dept: PSYCHIATRY | Facility: CLINIC | Age: 53
End: 2023-08-10
Payer: COMMERCIAL

## 2023-08-10 PROCEDURE — 90834 PSYTX W PT 45 MINUTES: CPT | Mod: 95

## 2023-08-11 NOTE — PLAN
[FreeTextEntry2] : Goal: Pt will reduce overall level, frequency and intensity of anxiety so that daily functioning is not impaired\par  Objective: Pt will replace negative and self-defeating self-talk with verbalization of realistic and positive cognitive messages.\par  Intervention: Clinician will assist pt to practice using healthy communication techniques in order to communicate her feelings and needs in order to help avoid isolating when experiencing depression or anxiety\par  Intervention: Clinician will reinforce positive, reality based cognitive messages that enhance self-confidence and increase adaptive action\par  Intervention: Clinician will provide education regarding healthy communication techniques and assist client in practicing these in sessions. \par  \par  \par   [Acceptance and Commitment Therapy] : Acceptance and Commitment Therapy  [Cognitive and/or Behavior Therapy] : Cognitive and/or Behavior Therapy  [Eye Movement Desensitization and Reprocessing Therapy (EMDR)] : Eye Movement Desensitization and Reprocessing Therapy (EMDR) [Psychodynamic Therapy] : Psychodynamic Therapy  [Psychoeducation] : Psychoeducation  [Skills training (all types)] : Skills training (all types)  [Supportive Therapy] : Supportive Therapy [de-identified] : Patient presented as oriented times three and with stable mood. No Si or HI reported. Session focus was on ways to best meet the moment in the here and now via awareness. Session utilized to help patient see ways to command respect for self via healthy actions, boundaries and assertion of needs. client found session validating for self and to integrate healthier reality-based messaging for self. Further work in this area to continue. Session was virtual today but switched to phone due to tele issues.  [FreeTextEntry1] : Patient is to continue to attend weekly psychotherapy sessions to focus in on chief complaints until goals/adaptive desired functioning is reached.

## 2023-08-14 ENCOUNTER — APPOINTMENT (OUTPATIENT)
Dept: DERMATOLOGY | Facility: CLINIC | Age: 53
End: 2023-08-14
Payer: COMMERCIAL

## 2023-08-14 DIAGNOSIS — C44.619 BASAL CELL CARCINOMA OF SKIN OF LEFT UPPER LIMB, INCLUDING SHOULDER: ICD-10-CM

## 2023-08-14 PROCEDURE — 99024 POSTOP FOLLOW-UP VISIT: CPT

## 2023-08-15 PROBLEM — C44.619 PRIMARY BASAL CELL CARCINOMA (BCC) OF LEFT SHOULDER: Status: ACTIVE | Noted: 2023-08-07

## 2023-08-17 ENCOUNTER — APPOINTMENT (OUTPATIENT)
Dept: PSYCHIATRY | Facility: CLINIC | Age: 53
End: 2023-08-17
Payer: COMMERCIAL

## 2023-08-17 PROCEDURE — 90837 PSYTX W PT 60 MINUTES: CPT | Mod: 95

## 2023-08-18 NOTE — PLAN
[FreeTextEntry2] : Goal: Pt will reduce overall level, frequency and intensity of anxiety so that daily functioning is not impaired\par  Objective: Pt will replace negative and self-defeating self-talk with verbalization of realistic and positive cognitive messages.\par  Intervention: Clinician will assist pt to practice using healthy communication techniques in order to communicate her feelings and needs in order to help avoid isolating when experiencing depression or anxiety\par  Intervention: Clinician will reinforce positive, reality based cognitive messages that enhance self-confidence and increase adaptive action\par  Intervention: Clinician will provide education regarding healthy communication techniques and assist client in practicing these in sessions. \par  \par  \par   [Acceptance and Commitment Therapy] : Acceptance and Commitment Therapy  [Cognitive and/or Behavior Therapy] : Cognitive and/or Behavior Therapy  [Eye Movement Desensitization and Reprocessing Therapy (EMDR)] : Eye Movement Desensitization and Reprocessing Therapy (EMDR) [Psychodynamic Therapy] : Psychodynamic Therapy  [Psychoeducation] : Psychoeducation  [Skills training (all types)] : Skills training (all types)  [Supportive Therapy] : Supportive Therapy [de-identified] : Patient presented as oriented times three and with stable mood. No Si or HI reported. Session focus further using here and now awareness to focus on what client can control vs not control for self and learning to let go to better put needs first and live a more stress free life for self. Client is working on furthiner bringing about this mindset daily. Further work needed with mindfulness components.  Session was virtual today [FreeTextEntry1] : Patient is to continue to attend weekly psychotherapy sessions to focus in on chief complaints until goals/adaptive desired functioning is reached.

## 2023-08-24 ENCOUNTER — APPOINTMENT (OUTPATIENT)
Dept: PSYCHIATRY | Facility: CLINIC | Age: 53
End: 2023-08-24
Payer: COMMERCIAL

## 2023-08-24 PROCEDURE — 90834 PSYTX W PT 45 MINUTES: CPT | Mod: 95

## 2023-08-31 ENCOUNTER — APPOINTMENT (OUTPATIENT)
Dept: PSYCHIATRY | Facility: CLINIC | Age: 53
End: 2023-08-31
Payer: COMMERCIAL

## 2023-08-31 PROCEDURE — 90834 PSYTX W PT 45 MINUTES: CPT | Mod: 95

## 2023-08-31 NOTE — PLAN
[FreeTextEntry2] : Goal: Pt will reduce overall level, frequency and intensity of anxiety so that daily functioning is not impaired\par  Objective: Pt will replace negative and self-defeating self-talk with verbalization of realistic and positive cognitive messages.\par  Intervention: Clinician will assist pt to practice using healthy communication techniques in order to communicate her feelings and needs in order to help avoid isolating when experiencing depression or anxiety\par  Intervention: Clinician will reinforce positive, reality based cognitive messages that enhance self-confidence and increase adaptive action\par  Intervention: Clinician will provide education regarding healthy communication techniques and assist client in practicing these in sessions. \par  \par  \par   [Acceptance and Commitment Therapy] : Acceptance and Commitment Therapy  [Cognitive and/or Behavior Therapy] : Cognitive and/or Behavior Therapy  [Eye Movement Desensitization and Reprocessing Therapy (EMDR)] : Eye Movement Desensitization and Reprocessing Therapy (EMDR) [Psychodynamic Therapy] : Psychodynamic Therapy  [Psychoeducation] : Psychoeducation  [Skills training (all types)] : Skills training (all types)  [Supportive Therapy] : Supportive Therapy [de-identified] : Patient presented as oriented times three and with stable mood. No Si or HI reported. Patient shared good news, and positive reality based messaging for self. Patient made progress in confidence and better validating self versus negative messaging. Clinician provided supportive therapy with emotional support and validation. Clincian provided healthy reality based messaging. Session was virtual today [FreeTextEntry1] : Patient is to continue to attend weekly psychotherapy sessions to focus in on chief complaints until goals/adaptive desired functioning is reached.

## 2023-09-07 ENCOUNTER — APPOINTMENT (OUTPATIENT)
Dept: PSYCHIATRY | Facility: CLINIC | Age: 53
End: 2023-09-07
Payer: COMMERCIAL

## 2023-09-07 PROCEDURE — 90832 PSYTX W PT 30 MINUTES: CPT | Mod: 95

## 2023-09-08 NOTE — PLAN
[FreeTextEntry2] : Goal: Pt will reduce overall level, frequency and intensity of anxiety so that daily functioning is not impaired\par  Objective: Pt will replace negative and self-defeating self-talk with verbalization of realistic and positive cognitive messages.\par  Intervention: Clinician will assist pt to practice using healthy communication techniques in order to communicate her feelings and needs in order to help avoid isolating when experiencing depression or anxiety\par  Intervention: Clinician will reinforce positive, reality based cognitive messages that enhance self-confidence and increase adaptive action\par  Intervention: Clinician will provide education regarding healthy communication techniques and assist client in practicing these in sessions. \par  \par  \par   [Acceptance and Commitment Therapy] : Acceptance and Commitment Therapy  [Cognitive and/or Behavior Therapy] : Cognitive and/or Behavior Therapy  [Eye Movement Desensitization and Reprocessing Therapy (EMDR)] : Eye Movement Desensitization and Reprocessing Therapy (EMDR) [Psychodynamic Therapy] : Psychodynamic Therapy  [Psychoeducation] : Psychoeducation  [Skills training (all types)] : Skills training (all types)  [Supportive Therapy] : Supportive Therapy [de-identified] : Patient presented as oriented times three and with stable mood. No Si or HI reported. Session focus was on empowerment of self and integrating healthier positive self-talk. Client responded well to same. Supportive therapy provided with emotional support and validation. (Session was cut short due to disconnection...clinician unable to get in contact again with patient)  Session was virtual today [FreeTextEntry1] : Patient is to continue to attend weekly psychotherapy sessions to focus in on chief complaints until goals/adaptive desired functioning is reached.

## 2023-09-14 ENCOUNTER — APPOINTMENT (OUTPATIENT)
Dept: PSYCHIATRY | Facility: CLINIC | Age: 53
End: 2023-09-14
Payer: COMMERCIAL

## 2023-09-14 PROCEDURE — 90837 PSYTX W PT 60 MINUTES: CPT | Mod: 95

## 2023-09-21 ENCOUNTER — APPOINTMENT (OUTPATIENT)
Dept: PSYCHIATRY | Facility: CLINIC | Age: 53
End: 2023-09-21
Payer: COMMERCIAL

## 2023-09-21 PROCEDURE — 90837 PSYTX W PT 60 MINUTES: CPT | Mod: GT

## 2023-09-28 ENCOUNTER — APPOINTMENT (OUTPATIENT)
Dept: PSYCHIATRY | Facility: CLINIC | Age: 53
End: 2023-09-28
Payer: COMMERCIAL

## 2023-09-28 ENCOUNTER — APPOINTMENT (OUTPATIENT)
Dept: PSYCHIATRY | Facility: CLINIC | Age: 53
End: 2023-09-28

## 2023-09-28 PROCEDURE — 90837 PSYTX W PT 60 MINUTES: CPT | Mod: GT

## 2023-10-03 ENCOUNTER — APPOINTMENT (OUTPATIENT)
Dept: PSYCHIATRY | Facility: CLINIC | Age: 53
End: 2023-10-03

## 2023-10-05 ENCOUNTER — APPOINTMENT (OUTPATIENT)
Dept: PSYCHIATRY | Facility: CLINIC | Age: 53
End: 2023-10-05
Payer: COMMERCIAL

## 2023-10-05 PROCEDURE — 90837 PSYTX W PT 60 MINUTES: CPT | Mod: GT

## 2023-10-12 ENCOUNTER — APPOINTMENT (OUTPATIENT)
Dept: PSYCHIATRY | Facility: CLINIC | Age: 53
End: 2023-10-12
Payer: COMMERCIAL

## 2023-10-12 PROCEDURE — 90837 PSYTX W PT 60 MINUTES: CPT | Mod: GT

## 2023-10-19 ENCOUNTER — APPOINTMENT (OUTPATIENT)
Dept: PSYCHIATRY | Facility: CLINIC | Age: 53
End: 2023-10-19
Payer: COMMERCIAL

## 2023-10-19 PROCEDURE — 90834 PSYTX W PT 45 MINUTES: CPT | Mod: GT

## 2023-10-26 ENCOUNTER — APPOINTMENT (OUTPATIENT)
Dept: PSYCHIATRY | Facility: CLINIC | Age: 53
End: 2023-10-26
Payer: COMMERCIAL

## 2023-10-26 PROCEDURE — 90837 PSYTX W PT 60 MINUTES: CPT | Mod: GT

## 2023-10-31 ENCOUNTER — APPOINTMENT (OUTPATIENT)
Dept: SURGERY | Facility: CLINIC | Age: 53
End: 2023-10-31
Payer: COMMERCIAL

## 2023-10-31 DIAGNOSIS — E04.9 NONTOXIC GOITER, UNSPECIFIED: ICD-10-CM

## 2023-10-31 DIAGNOSIS — E04.2 NONTOXIC MULTINODULAR GOITER: ICD-10-CM

## 2023-10-31 PROCEDURE — 99213 OFFICE O/P EST LOW 20 MIN: CPT

## 2023-11-02 ENCOUNTER — APPOINTMENT (OUTPATIENT)
Dept: PSYCHIATRY | Facility: CLINIC | Age: 53
End: 2023-11-02
Payer: COMMERCIAL

## 2023-11-02 PROCEDURE — 90837 PSYTX W PT 60 MINUTES: CPT | Mod: GT

## 2023-11-03 NOTE — PRE-ANESTHESIA EVALUATION ADULT - NSANTHPMHFT_GEN_ALL_CORE
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 Patient is to wear sunprotection, and avoid picking at any of the treated lesions. Pt may apply Vaseline to crusted or scabbing areas. Duration Of Freeze Thaw-Cycle (Seconds): 0 Detail Level: Detailed Number Of Freeze-Thaw Cycles: 2 freeze-thaw cycles Consent: The patient's consent was obtained including but not limited to risks of crusting, scabbing, blistering, scarring, darker or lighter pigmentary change, recurrence, incomplete removal and infection.

## 2023-11-09 ENCOUNTER — APPOINTMENT (OUTPATIENT)
Dept: PSYCHIATRY | Facility: CLINIC | Age: 53
End: 2023-11-09

## 2023-11-16 ENCOUNTER — APPOINTMENT (OUTPATIENT)
Dept: PSYCHIATRY | Facility: CLINIC | Age: 53
End: 2023-11-16
Payer: COMMERCIAL

## 2023-11-16 PROCEDURE — 90837 PSYTX W PT 60 MINUTES: CPT | Mod: GT

## 2023-11-17 ENCOUNTER — APPOINTMENT (OUTPATIENT)
Dept: ULTRASOUND IMAGING | Facility: CLINIC | Age: 53
End: 2023-11-17
Payer: COMMERCIAL

## 2023-11-17 ENCOUNTER — OUTPATIENT (OUTPATIENT)
Dept: OUTPATIENT SERVICES | Facility: HOSPITAL | Age: 53
LOS: 1 days | End: 2023-11-17
Payer: COMMERCIAL

## 2023-11-17 DIAGNOSIS — Z90.411 ACQUIRED PARTIAL ABSENCE OF PANCREAS: Chronic | ICD-10-CM

## 2023-11-17 DIAGNOSIS — E04.9 NONTOXIC GOITER, UNSPECIFIED: ICD-10-CM

## 2023-11-17 PROCEDURE — 76536 US EXAM OF HEAD AND NECK: CPT

## 2023-11-17 PROCEDURE — 76536 US EXAM OF HEAD AND NECK: CPT | Mod: 26

## 2023-11-30 ENCOUNTER — APPOINTMENT (OUTPATIENT)
Dept: PSYCHIATRY | Facility: CLINIC | Age: 53
End: 2023-11-30

## 2023-12-07 ENCOUNTER — APPOINTMENT (OUTPATIENT)
Dept: PSYCHIATRY | Facility: CLINIC | Age: 53
End: 2023-12-07
Payer: COMMERCIAL

## 2023-12-07 PROCEDURE — 90837 PSYTX W PT 60 MINUTES: CPT | Mod: GT

## 2023-12-14 ENCOUNTER — APPOINTMENT (OUTPATIENT)
Dept: PSYCHIATRY | Facility: CLINIC | Age: 53
End: 2023-12-14
Payer: COMMERCIAL

## 2023-12-14 PROCEDURE — 90834 PSYTX W PT 45 MINUTES: CPT | Mod: GT
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
## 2023-12-21 ENCOUNTER — APPOINTMENT (OUTPATIENT)
Dept: PSYCHIATRY | Facility: CLINIC | Age: 53
End: 2023-12-21
Payer: COMMERCIAL

## 2023-12-21 PROCEDURE — 90832 PSYTX W PT 30 MINUTES: CPT | Mod: GT
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
## 2023-12-28 ENCOUNTER — APPOINTMENT (OUTPATIENT)
Dept: PSYCHIATRY | Facility: CLINIC | Age: 53
End: 2023-12-28

## 2024-01-04 ENCOUNTER — APPOINTMENT (OUTPATIENT)
Dept: PSYCHIATRY | Facility: CLINIC | Age: 54
End: 2024-01-04
Payer: COMMERCIAL

## 2024-01-04 PROCEDURE — 90834 PSYTX W PT 45 MINUTES: CPT | Mod: GT
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
## 2024-01-11 ENCOUNTER — APPOINTMENT (OUTPATIENT)
Dept: PSYCHIATRY | Facility: CLINIC | Age: 54
End: 2024-01-11
Payer: COMMERCIAL

## 2024-01-11 PROCEDURE — 90834 PSYTX W PT 45 MINUTES: CPT | Mod: GT

## 2024-01-14 NOTE — END OF VISIT
[Individual Psychotherapy for 38-52 minutes] : Individual Psychotherapy for 38 - 52 minutes [Teletherapy Service Provided] : The services provided in this session were delivered via tele-therapy [FreeTextEntry5] :  office [FreeTextEntry3] : Work office

## 2024-01-14 NOTE — PLAN
[FreeTextEntry2] : Goal: Pt will reduce overall level, frequency and intensity of anxiety so that daily functioning is not impaired\par  Objective: Pt will replace negative and self-defeating self-talk with verbalization of realistic and positive cognitive messages.\par  Intervention: Clinician will assist pt to practice using healthy communication techniques in order to communicate her feelings and needs in order to help avoid isolating when experiencing depression or anxiety\par  Intervention: Clinician will reinforce positive, reality based cognitive messages that enhance self-confidence and increase adaptive action\par  Intervention: Clinician will provide education regarding healthy communication techniques and assist client in practicing these in sessions. \par  \par  \par   [Acceptance and Commitment Therapy] : Acceptance and Commitment Therapy  [Cognitive and/or Behavior Therapy] : Cognitive and/or Behavior Therapy  [Eye Movement Desensitization and Reprocessing Therapy (EMDR)] : Eye Movement Desensitization and Reprocessing Therapy (EMDR) [Psychodynamic Therapy] : Psychodynamic Therapy  [Psychoeducation] : Psychoeducation  [Supportive Therapy] : Supportive Therapy [Skills training (all types)] : Skills training (all types)  [de-identified] : Patient presented as oriented times three and with stable mood. No Si or HI reported. Session focus on family conflict and navigating family dynamics. Supportive therapy provided with emotional support and validation, with CBT re-framing. Client continues to make progress amidst marital conflict with healthy integration of mindfulness attitude and perspective. Maintenance work to continue with further skills via psychoeducation.  Session was virtual today. [FreeTextEntry1] : Patient is to continue to attend weekly psychotherapy sessions to focus in on chief complaints until goals/adaptive desired functioning is reached.

## 2024-01-18 ENCOUNTER — APPOINTMENT (OUTPATIENT)
Dept: PSYCHIATRY | Facility: CLINIC | Age: 54
End: 2024-01-18

## 2024-01-21 NOTE — RISK ASSESSMENT
None [No, patient denies ideation or behavior] : No, patient denies ideation or behavior [No] : No [FreeTextEntry8] : Denies [FreeTextEntry7] : denies

## 2024-01-25 ENCOUNTER — APPOINTMENT (OUTPATIENT)
Dept: PSYCHIATRY | Facility: CLINIC | Age: 54
End: 2024-01-25

## 2024-02-08 ENCOUNTER — APPOINTMENT (OUTPATIENT)
Dept: PSYCHIATRY | Facility: CLINIC | Age: 54
End: 2024-02-08
Payer: COMMERCIAL

## 2024-02-08 PROCEDURE — 90834 PSYTX W PT 45 MINUTES: CPT | Mod: 95
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
## 2024-02-10 NOTE — PHYSICAL EXAM
[Euthymic] : euthymic [Cooperative] : cooperative [Full] : full [Clear] : clear [Linear/Goal Directed] : linear/goal directed [WNL] : within normal limits
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
## 2024-02-22 ENCOUNTER — APPOINTMENT (OUTPATIENT)
Dept: PSYCHIATRY | Facility: CLINIC | Age: 54
End: 2024-02-22

## 2024-02-29 ENCOUNTER — APPOINTMENT (OUTPATIENT)
Dept: PSYCHIATRY | Facility: CLINIC | Age: 54
End: 2024-02-29
Payer: COMMERCIAL

## 2024-02-29 PROCEDURE — 90837 PSYTX W PT 60 MINUTES: CPT | Mod: 95

## 2024-03-04 NOTE — PLAN
[FreeTextEntry2] : Goal: Pt will reduce overall level, frequency and intensity of anxiety so that daily functioning is not impaired\par  Objective: Pt will replace negative and self-defeating self-talk with verbalization of realistic and positive cognitive messages.\par  Intervention: Clinician will assist pt to practice using healthy communication techniques in order to communicate her feelings and needs in order to help avoid isolating when experiencing depression or anxiety\par  Intervention: Clinician will reinforce positive, reality based cognitive messages that enhance self-confidence and increase adaptive action\par  Intervention: Clinician will provide education regarding healthy communication techniques and assist client in practicing these in sessions. \par  \par  \par   [Cognitive and/or Behavior Therapy] : Cognitive and/or Behavior Therapy  [Acceptance and Commitment Therapy] : Acceptance and Commitment Therapy  [Eye Movement Desensitization and Reprocessing Therapy (EMDR)] : Eye Movement Desensitization and Reprocessing Therapy (EMDR) [Psychodynamic Therapy] : Psychodynamic Therapy  [Psychoeducation] : Psychoeducation  [Supportive Therapy] : Supportive Therapy [Skills training (all types)] : Skills training (all types)  [de-identified] : Patient presented as oriented times three and with stable mood. No Si or HI reported. Supportive therapy provided with emotional support and validation, with CBT re-framing. Client and clinician discussed and evaluated best ways to put needs first and wants for self-care and advocating for self. Review of healthy coping skills provided. Pt responded well.  Session was virtual today. [FreeTextEntry1] : Patient is to continue to attend weekly psychotherapy sessions to focus in on chief complaints until goals/adaptive desired functioning is reached.

## 2024-03-04 NOTE — PHYSICAL EXAM
[Cooperative] : cooperative [Clear] : clear [Full] : full [Euthymic] : euthymic [Linear/Goal Directed] : linear/goal directed [WNL] : within normal limits

## 2024-03-07 ENCOUNTER — APPOINTMENT (OUTPATIENT)
Dept: PSYCHIATRY | Facility: CLINIC | Age: 54
End: 2024-03-07

## 2024-03-15 ENCOUNTER — APPOINTMENT (OUTPATIENT)
Dept: PSYCHIATRY | Facility: CLINIC | Age: 54
End: 2024-03-15
Payer: COMMERCIAL

## 2024-03-15 PROCEDURE — 90834 PSYTX W PT 45 MINUTES: CPT | Mod: 95

## 2024-03-19 ENCOUNTER — APPOINTMENT (OUTPATIENT)
Dept: PSYCHIATRY | Facility: CLINIC | Age: 54
End: 2024-03-19
Payer: COMMERCIAL

## 2024-03-19 PROCEDURE — 90834 PSYTX W PT 45 MINUTES: CPT | Mod: 95

## 2024-03-19 NOTE — PLAN
[FreeTextEntry2] : Goal: Pt will reduce overall level, frequency and intensity of anxiety so that daily functioning is not impaired\par  Objective: Pt will replace negative and self-defeating self-talk with verbalization of realistic and positive cognitive messages.\par  Intervention: Clinician will assist pt to practice using healthy communication techniques in order to communicate her feelings and needs in order to help avoid isolating when experiencing depression or anxiety\par  Intervention: Clinician will reinforce positive, reality based cognitive messages that enhance self-confidence and increase adaptive action\par  Intervention: Clinician will provide education regarding healthy communication techniques and assist client in practicing these in sessions. \par  \par  \par   [Acceptance and Commitment Therapy] : Acceptance and Commitment Therapy  [Cognitive and/or Behavior Therapy] : Cognitive and/or Behavior Therapy  [Eye Movement Desensitization and Reprocessing Therapy (EMDR)] : Eye Movement Desensitization and Reprocessing Therapy (EMDR) [Psychoeducation] : Psychoeducation  [Psychodynamic Therapy] : Psychodynamic Therapy  [Skills training (all types)] : Skills training (all types)  [Supportive Therapy] : Supportive Therapy [FreeTextEntry1] : Patient is to continue to attend weekly psychotherapy sessions to focus in on chief complaints until goals/adaptive desired functioning is reached.  [de-identified] : Patient presented as oriented times three and with stable mood. No Si or HI reported. Supportive therapy provided with emotional support and valdiation. Focus on needs and wants for self-care and healthy setting of internal boundaries. Pt responded well to same. Further work to continue. possibly via mindfulness components.  Session was virtual today.

## 2024-03-19 NOTE — END OF VISIT
[Individual Psychotherapy for 38-52 minutes] : Individual Psychotherapy for 38 - 52 minutes [FreeTextEntry3] : Work office [FreeTextEntry5] :  office [Teletherapy Service Provided] : The services provided in this session were delivered via tele-therapy

## 2024-03-19 NOTE — PLAN
[FreeTextEntry2] : Goal: Pt will reduce overall level, frequency and intensity of anxiety so that daily functioning is not impaired\par  Objective: Pt will replace negative and self-defeating self-talk with verbalization of realistic and positive cognitive messages.\par  Intervention: Clinician will assist pt to practice using healthy communication techniques in order to communicate her feelings and needs in order to help avoid isolating when experiencing depression or anxiety\par  Intervention: Clinician will reinforce positive, reality based cognitive messages that enhance self-confidence and increase adaptive action\par  Intervention: Clinician will provide education regarding healthy communication techniques and assist client in practicing these in sessions. \par  \par  \par   [Cognitive and/or Behavior Therapy] : Cognitive and/or Behavior Therapy  [Acceptance and Commitment Therapy] : Acceptance and Commitment Therapy  [Eye Movement Desensitization and Reprocessing Therapy (EMDR)] : Eye Movement Desensitization and Reprocessing Therapy (EMDR) [Psychodynamic Therapy] : Psychodynamic Therapy  [Psychoeducation] : Psychoeducation  [Skills training (all types)] : Skills training (all types)  [Supportive Therapy] : Supportive Therapy [de-identified] : Patient presented as oriented times three and with stable mood. No Si or HI reported. Supportive therapy provided with emotional support and validation and recent on-going family difficulties.  Session was virtual today. [FreeTextEntry1] : Patient is to continue to attend weekly psychotherapy sessions to focus in on chief complaints until goals/adaptive desired functioning is reached.

## 2024-03-19 NOTE — END OF VISIT
[Individual Psychotherapy for 38-52 minutes] : Individual Psychotherapy for 38 - 52 minutes [FreeTextEntry3] : Work office [Teletherapy Service Provided] : The services provided in this session were delivered via tele-therapy [FreeTextEntry5] :  office

## 2024-03-19 NOTE — PHYSICAL EXAM
[Cooperative] : cooperative [Euthymic] : euthymic [Clear] : clear [Full] : full [Linear/Goal Directed] : linear/goal directed [WNL] : within normal limits

## 2024-03-21 NOTE — ASU PREOP CHECKLIST - AS TEMP SITE
Patient taken to bathroom via wheelchair. Family member in bathroom assisting patient. Patient attempting to provide urine sample.    forehead

## 2024-03-27 ENCOUNTER — APPOINTMENT (OUTPATIENT)
Dept: PSYCHIATRY | Facility: CLINIC | Age: 54
End: 2024-03-27
Payer: COMMERCIAL

## 2024-03-27 PROCEDURE — 90834 PSYTX W PT 45 MINUTES: CPT | Mod: 95
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
## 2024-04-04 ENCOUNTER — APPOINTMENT (OUTPATIENT)
Dept: PSYCHIATRY | Facility: CLINIC | Age: 54
End: 2024-04-04
Payer: COMMERCIAL

## 2024-04-04 PROCEDURE — 90834 PSYTX W PT 45 MINUTES: CPT | Mod: 95
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
## 2024-04-11 ENCOUNTER — APPOINTMENT (OUTPATIENT)
Dept: PSYCHIATRY | Facility: CLINIC | Age: 54
End: 2024-04-11
Payer: COMMERCIAL

## 2024-04-11 PROCEDURE — 90837 PSYTX W PT 60 MINUTES: CPT | Mod: 95
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
## 2024-04-18 ENCOUNTER — APPOINTMENT (OUTPATIENT)
Dept: PSYCHIATRY | Facility: CLINIC | Age: 54
End: 2024-04-18
Payer: COMMERCIAL

## 2024-04-18 PROCEDURE — 90837 PSYTX W PT 60 MINUTES: CPT | Mod: 95

## 2024-05-02 ENCOUNTER — APPOINTMENT (OUTPATIENT)
Dept: PSYCHIATRY | Facility: CLINIC | Age: 54
End: 2024-05-02
Payer: COMMERCIAL

## 2024-05-02 PROCEDURE — 90837 PSYTX W PT 60 MINUTES: CPT | Mod: 95
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
## 2024-05-16 ENCOUNTER — APPOINTMENT (OUTPATIENT)
Dept: PSYCHIATRY | Facility: CLINIC | Age: 54
End: 2024-05-16
Payer: COMMERCIAL

## 2024-05-16 PROCEDURE — 90837 PSYTX W PT 60 MINUTES: CPT | Mod: 95
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
## 2024-05-22 ENCOUNTER — APPOINTMENT (OUTPATIENT)
Dept: DERMATOLOGY | Facility: CLINIC | Age: 54
End: 2024-05-22
Payer: COMMERCIAL

## 2024-05-22 DIAGNOSIS — L91.0 HYPERTROPHIC SCAR: ICD-10-CM

## 2024-05-22 DIAGNOSIS — Z85.828 PERSONAL HISTORY OF OTHER MALIGNANT NEOPLASM OF SKIN: ICD-10-CM

## 2024-05-22 DIAGNOSIS — L82.1 OTHER SEBORRHEIC KERATOSIS: ICD-10-CM

## 2024-05-22 DIAGNOSIS — D48.5 NEOPLASM OF UNCERTAIN BEHAVIOR OF SKIN: ICD-10-CM

## 2024-05-22 DIAGNOSIS — Z12.83 ENCOUNTER FOR SCREENING FOR MALIGNANT NEOPLASM OF SKIN: ICD-10-CM

## 2024-05-22 DIAGNOSIS — L81.4 OTHER MELANIN HYPERPIGMENTATION: ICD-10-CM

## 2024-05-22 DIAGNOSIS — L57.0 ACTINIC KERATOSIS: ICD-10-CM

## 2024-05-22 DIAGNOSIS — D48.9 NEOPLASM OF UNCERTAIN BEHAVIOR, UNSPECIFIED: ICD-10-CM

## 2024-05-22 PROCEDURE — 11102 TANGNTL BX SKIN SINGLE LES: CPT | Mod: 59

## 2024-05-22 PROCEDURE — 11900 INJECT SKIN LESIONS </W 7: CPT | Mod: 59

## 2024-05-22 PROCEDURE — 11103 TANGNTL BX SKIN EA SEP/ADDL: CPT | Mod: 59

## 2024-05-22 PROCEDURE — 99213 OFFICE O/P EST LOW 20 MIN: CPT | Mod: 25

## 2024-05-22 PROCEDURE — 17000 DESTRUCT PREMALG LESION: CPT | Mod: 59

## 2024-05-23 ENCOUNTER — APPOINTMENT (OUTPATIENT)
Dept: PSYCHIATRY | Facility: CLINIC | Age: 54
End: 2024-05-23
Payer: COMMERCIAL

## 2024-05-23 PROBLEM — Z85.828 HISTORY OF BASAL CELL CARCINOMA: Status: ACTIVE | Noted: 2022-09-21

## 2024-05-23 PROBLEM — D48.9 NEOPLASM OF UNCERTAIN BEHAVIOR: Status: ACTIVE | Noted: 2018-10-19

## 2024-05-23 PROBLEM — D48.5 NEOPLASM OF UNCERTAIN BEHAVIOR OF SKIN: Status: ACTIVE | Noted: 2018-10-19

## 2024-05-23 PROBLEM — L82.1 SEBORRHEIC KERATOSES: Status: ACTIVE | Noted: 2022-09-21

## 2024-05-23 PROBLEM — L91.0 KELOID: Status: ACTIVE | Noted: 2024-05-23

## 2024-05-23 PROCEDURE — 90837 PSYTX W PT 60 MINUTES: CPT | Mod: 95

## 2024-05-23 NOTE — ASSESSMENT
[FreeTextEntry1] : # Neoplasm of uncertain behavior x2 R chest - r/o NMSC R hip - r/o melanoma vs. dermal nevus - Recommend tangential shave biopsy x2 for definitive diagnosis.   - After informed consent was obtained, using Hibiclens for cleansing and 1% Lidocaine with epinephrine for anesthetic, with sterile technique a shave biopsy was used to obtain a biopsy specimen of the lesion. Hemostasis was obtained with aluminum chloride.  Vaseline was applied, and wound care instructions provided. The specimen was labeled and sent to pathology for evaluation. The procedure was well tolerated without complication.   - The patient will be contacted with biopsy results  # Actinic Keratoses x1, L foraarm - Patient was verbally consented and the lesions identified above were treated with liquid nitrogen freeze, thaw, freeze x 10 seconds each cycle x 2. Side effects include blister formation, hypopigmentation, and scarring. The patient tolerated the procedure well.  Wound care instructions, care of a blister with vaseline, signs and symptoms of infection were discussed in full.  The patient denies any questions at this time.  # Hx of Multiple BCCs (upper lip, L shin, L lower leg, L jawline, R ankle, L shoulder) - No evidence of recurrence at prior sites - Sun protective measures reinforced - Recommend full body skin exam atleast annually - Continue with niacinamide 500mg BID  to reduce risk of NMSCs  # Keloid, L shoulder -  Patient verbally consented. The risks/benefits/alternatives of intralesional injections were reviewed with the patient which include but are not limited to bleeding, pain, skin atrophy, and dyschromia (lightening of skin). Discussed possible lack of treatment efficacy and need for repeat treatments.  Site confirmed. Area prepped with alcohol.  Intralesional kenalog 10 mg/cc x 0.3 cc injected today.  Discussed wound care instructions. Patient tolerated well with no immediate complications.   # Solar lentigines - Discussed etiology and benign nature of condition - Sun protective measures reinforced. Recommended OTC sunscreen products, including SPF30+ with broadband UV protection as well as proper use.  # Seborrheic Keratosis - These growths are benign - Related to genetics - these lesions run in families; NOT related to sun exposure - No treatment warranted unless inflamed; can use OTC Sarna lotion PRN itch  # Screening exam for skin cancer   - TBSE performed today - Advised sun protection.  Recommended OTC sunscreen products (EltaMD/Neutrogena/La Roche Posay), including SPF30+ with broadband UV protection as well as proper use.  Discussed OTC sun protective clothing - Counseled patient to monitor for changes, including mole monitoring and self-skin exams

## 2024-05-23 NOTE — PHYSICAL EXAM
[Alert] : alert [Oriented x 3] : ~L oriented x 3 [Well Nourished] : well nourished [Conjunctiva Non-injected] : conjunctiva non-injected [No Visual Lymphadenopathy] : no visual  lymphadenopathy [No Clubbing] : no clubbing [No Edema] : no edema [No Bromhidrosis] : no bromhidrosis [No Chromhidrosis] : no chromhidrosis [Full Body Skin Exam Performed] : performed [FreeTextEntry3] : General: Alert and oriented, in NAD.  All of the following were examined and were within normal limits, except as noted:   Scalp: Face, including eyelids, nose, lips, ears, oropharynx: Neck: Chest/Back/Abdomen: Bilateral Arms/Hands: Bilateral Legs/Feet: Buttocks, Genitalia, Anus/perineum:  	 Hair, Oral Mucosa, Eyes:   nails not examined due to polish - scaly red plaque R chest - pink brown macule R hip - gritty pink papule L forearm - WHSS with keloidal areas on L shoulder - lentigines - SKs

## 2024-05-23 NOTE — HISTORY OF PRESENT ILLNESS
[FreeTextEntry1] : fu: spots [de-identified] : Ms. CHARLEY RUSSO is a 54 year old F here for FUV of below   #Sore on R chest #Itchy scar at site of BCC excision on L shoulder #FBSE.  Spots scattered on body x years. Asymptomatic and unchanged. No alleviating/aggravating factors. Never been treated.  Derm Hx:  - BCC upper lip (~2010 ), s/p Mohs outside practice - BCC left shin s/p Mohs March 2019  - BCC L jawline 3/2020 s/p Mohs w/ Dr. 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 2 boys, 1 daughter. Daughter had ovarian torsian with complications, so pt has been very occupied
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
## 2024-05-29 ENCOUNTER — TRANSCRIPTION ENCOUNTER (OUTPATIENT)
Age: 54
End: 2024-05-29

## 2024-05-29 LAB — DERMATOLOGY BIOPSY: NORMAL

## 2024-05-30 ENCOUNTER — APPOINTMENT (OUTPATIENT)
Dept: PSYCHIATRY | Facility: CLINIC | Age: 54
End: 2024-05-30
Payer: COMMERCIAL

## 2024-05-30 PROCEDURE — 90837 PSYTX W PT 60 MINUTES: CPT | Mod: 95
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
## 2024-06-06 ENCOUNTER — APPOINTMENT (OUTPATIENT)
Dept: PSYCHIATRY | Facility: CLINIC | Age: 54
End: 2024-06-06
Payer: COMMERCIAL

## 2024-06-06 PROCEDURE — 90837 PSYTX W PT 60 MINUTES: CPT | Mod: 95

## 2024-06-07 ENCOUNTER — NON-APPOINTMENT (OUTPATIENT)
Age: 54
End: 2024-06-07
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
## 2024-06-13 ENCOUNTER — APPOINTMENT (OUTPATIENT)
Dept: PSYCHIATRY | Facility: CLINIC | Age: 54
End: 2024-06-13
Payer: COMMERCIAL

## 2024-06-13 PROCEDURE — 90837 PSYTX W PT 60 MINUTES: CPT | Mod: 95

## 2024-06-17 NOTE — PLAN
[FreeTextEntry2] : Goal: Pt will reduce overall level, frequency and intensity of anxiety so that daily functioning is not impaired\par  Objective: Pt will replace negative and self-defeating self-talk with verbalization of realistic and positive cognitive messages.\par  Intervention: Clinician will assist pt to practice using healthy communication techniques in order to communicate her feelings and needs in order to help avoid isolating when experiencing depression or anxiety\par  Intervention: Clinician will reinforce positive, reality based cognitive messages that enhance self-confidence and increase adaptive action\par  Intervention: Clinician will provide education regarding healthy communication techniques and assist client in practicing these in sessions. \par  \par  \par   [Acceptance and Commitment Therapy] : Acceptance and Commitment Therapy  [Cognitive and/or Behavior Therapy] : Cognitive and/or Behavior Therapy  [Eye Movement Desensitization and Reprocessing Therapy (EMDR)] : Eye Movement Desensitization and Reprocessing Therapy (EMDR) [Psychodynamic Therapy] : Psychodynamic Therapy  [Psychoeducation] : Psychoeducation  [Skills training (all types)] : Skills training (all types)  [Supportive Therapy] : Supportive Therapy [de-identified] : Patient presented as oriented times three and with stable mood. No Si or HI reported. Session focus on difficulty with children and behavioral issues. Supportve therapy provided with conflict reoslution skills and validating support.  Session was virtual then switched to phone due to telehealth failure.  [FreeTextEntry1] : Patient is to continue to attend weekly psychotherapy sessions to focus in on chief complaints until goals/adaptive desired functioning is reached.

## 2024-06-18 ENCOUNTER — APPOINTMENT (OUTPATIENT)
Dept: PSYCHIATRY | Facility: CLINIC | Age: 54
End: 2024-06-18
Payer: COMMERCIAL

## 2024-06-18 PROCEDURE — 90834 PSYTX W PT 45 MINUTES: CPT | Mod: 95

## 2024-06-20 NOTE — PLAN
[FreeTextEntry2] : Goal: Pt will reduce overall level, frequency and intensity of anxiety so that daily functioning is not impaired\par  Objective: Pt will replace negative and self-defeating self-talk with verbalization of realistic and positive cognitive messages.\par  Intervention: Clinician will assist pt to practice using healthy communication techniques in order to communicate her feelings and needs in order to help avoid isolating when experiencing depression or anxiety\par  Intervention: Clinician will reinforce positive, reality based cognitive messages that enhance self-confidence and increase adaptive action\par  Intervention: Clinician will provide education regarding healthy communication techniques and assist client in practicing these in sessions. \par  \par  \par   [Acceptance and Commitment Therapy] : Acceptance and Commitment Therapy  [Cognitive and/or Behavior Therapy] : Cognitive and/or Behavior Therapy  [Eye Movement Desensitization and Reprocessing Therapy (EMDR)] : Eye Movement Desensitization and Reprocessing Therapy (EMDR) [Psychodynamic Therapy] : Psychodynamic Therapy  [Psychoeducation] : Psychoeducation  [Skills training (all types)] : Skills training (all types)  [Supportive Therapy] : Supportive Therapy [de-identified] : Patient presented as oriented times three and with stable mood. No Si or HI reported. Session focus on parenting stressors and difficulties with same. Supportive therapy provided with emotional support and validation and best ways to proceed. MIndulness techniqes and brief insights provided to help client better track and center self. Patient responded well to same.  Session was virtual and client responded well to same.  [FreeTextEntry1] : Patient is to continue to attend weekly psychotherapy sessions to focus in on chief complaints until goals/adaptive desired functioning is reached.

## 2024-06-27 ENCOUNTER — APPOINTMENT (OUTPATIENT)
Dept: PSYCHIATRY | Facility: CLINIC | Age: 54
End: 2024-06-27
Payer: COMMERCIAL

## 2024-06-27 PROCEDURE — 90837 PSYTX W PT 60 MINUTES: CPT | Mod: 95

## 2024-07-10 ENCOUNTER — APPOINTMENT (OUTPATIENT)
Dept: PSYCHIATRY | Facility: CLINIC | Age: 54
End: 2024-07-10
Payer: COMMERCIAL

## 2024-07-10 PROCEDURE — 90837 PSYTX W PT 60 MINUTES: CPT | Mod: 95

## 2024-07-18 ENCOUNTER — APPOINTMENT (OUTPATIENT)
Dept: PSYCHIATRY | Facility: CLINIC | Age: 54
End: 2024-07-18
Payer: COMMERCIAL

## 2024-07-18 PROCEDURE — 90834 PSYTX W PT 45 MINUTES: CPT | Mod: 95

## 2024-07-25 ENCOUNTER — APPOINTMENT (OUTPATIENT)
Dept: PSYCHIATRY | Facility: CLINIC | Age: 54
End: 2024-07-25
Payer: COMMERCIAL

## 2024-07-25 PROCEDURE — 90837 PSYTX W PT 60 MINUTES: CPT | Mod: 95

## 2024-07-25 NOTE — PLAN
[FreeTextEntry2] : Goal: Pt will reduce overall level, frequency and intensity of anxiety so that daily functioning is not impaired\par  Objective: Pt will replace negative and self-defeating self-talk with verbalization of realistic and positive cognitive messages.\par  Intervention: Clinician will assist pt to practice using healthy communication techniques in order to communicate her feelings and needs in order to help avoid isolating when experiencing depression or anxiety\par  Intervention: Clinician will reinforce positive, reality based cognitive messages that enhance self-confidence and increase adaptive action\par  Intervention: Clinician will provide education regarding healthy communication techniques and assist client in practicing these in sessions. \par  \par  \par   [Acceptance and Commitment Therapy] : Acceptance and Commitment Therapy  [Cognitive and/or Behavior Therapy] : Cognitive and/or Behavior Therapy  [Eye Movement Desensitization and Reprocessing Therapy (EMDR)] : Eye Movement Desensitization and Reprocessing Therapy (EMDR) [Psychodynamic Therapy] : Psychodynamic Therapy  [Psychoeducation] : Psychoeducation  [Skills training (all types)] : Skills training (all types)  [Supportive Therapy] : Supportive Therapy [de-identified] : Patient presented as oriented times three and with stable mood. No Si or HI reported. Supportive therapy provided around on-going issues with family and feelings of being disregarded and dismissed. session focus on self-care and best ways client can support self.  [FreeTextEntry1] : Patient is to continue to attend weekly psychotherapy sessions to focus in on chief complaints until goals/adaptive desired functioning is reached.

## 2024-08-01 ENCOUNTER — APPOINTMENT (OUTPATIENT)
Dept: PSYCHIATRY | Facility: CLINIC | Age: 54
End: 2024-08-01
Payer: COMMERCIAL

## 2024-08-01 PROCEDURE — 90837 PSYTX W PT 60 MINUTES: CPT | Mod: 95
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
## 2024-08-08 ENCOUNTER — APPOINTMENT (OUTPATIENT)
Dept: PSYCHIATRY | Facility: CLINIC | Age: 54
End: 2024-08-08

## 2024-08-23 ENCOUNTER — APPOINTMENT (OUTPATIENT)
Dept: PSYCHIATRY | Facility: CLINIC | Age: 54
End: 2024-08-23
Payer: COMMERCIAL

## 2024-08-23 PROCEDURE — 90837 PSYTX W PT 60 MINUTES: CPT | Mod: 95

## 2024-08-23 NOTE — PLAN
[FreeTextEntry2] : Goal: Pt will reduce overall level, frequency and intensity of anxiety so that daily functioning is not impaired\par  Objective: Pt will replace negative and self-defeating self-talk with verbalization of realistic and positive cognitive messages.\par  Intervention: Clinician will assist pt to practice using healthy communication techniques in order to communicate her feelings and needs in order to help avoid isolating when experiencing depression or anxiety\par  Intervention: Clinician will reinforce positive, reality based cognitive messages that enhance self-confidence and increase adaptive action\par  Intervention: Clinician will provide education regarding healthy communication techniques and assist client in practicing these in sessions. \par  \par  \par   [Acceptance and Commitment Therapy] : Acceptance and Commitment Therapy  [Cognitive and/or Behavior Therapy] : Cognitive and/or Behavior Therapy  [Eye Movement Desensitization and Reprocessing Therapy (EMDR)] : Eye Movement Desensitization and Reprocessing Therapy (EMDR) [Psychodynamic Therapy] : Psychodynamic Therapy  [Psychoeducation] : Psychoeducation  [Skills training (all types)] : Skills training (all types)  [Supportive Therapy] : Supportive Therapy [de-identified] : Patient presented as oriented times three and with stable mood. No Si or HI reported. Supportive therapy provided with emotional support and validation. Session focus on continued utilization of healthy boundaries and healthy communication skills. Session focus also on re-framing via negative self talk and integrating positive reality based messaging.  Session was in-person.  [FreeTextEntry1] : Patient is to continue to attend weekly psychotherapy sessions to focus in on chief complaints until goals/adaptive desired functioning is reached.

## 2024-09-05 ENCOUNTER — APPOINTMENT (OUTPATIENT)
Dept: PSYCHIATRY | Facility: CLINIC | Age: 54
End: 2024-09-05
Payer: COMMERCIAL

## 2024-09-05 PROCEDURE — 90837 PSYTX W PT 60 MINUTES: CPT | Mod: 95
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
## 2024-09-12 ENCOUNTER — APPOINTMENT (OUTPATIENT)
Dept: PSYCHIATRY | Facility: CLINIC | Age: 54
End: 2024-09-12
Payer: COMMERCIAL

## 2024-09-12 PROCEDURE — 90837 PSYTX W PT 60 MINUTES: CPT | Mod: 95
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
## 2024-09-19 ENCOUNTER — APPOINTMENT (OUTPATIENT)
Dept: PSYCHIATRY | Facility: CLINIC | Age: 54
End: 2024-09-19
Payer: COMMERCIAL

## 2024-09-19 PROCEDURE — 90837 PSYTX W PT 60 MINUTES: CPT | Mod: 95
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
## 2024-09-26 ENCOUNTER — APPOINTMENT (OUTPATIENT)
Dept: PSYCHIATRY | Facility: CLINIC | Age: 54
End: 2024-09-26
Payer: COMMERCIAL

## 2024-09-26 PROCEDURE — 90837 PSYTX W PT 60 MINUTES: CPT | Mod: 95
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
## 2024-10-03 ENCOUNTER — APPOINTMENT (OUTPATIENT)
Dept: PSYCHIATRY | Facility: CLINIC | Age: 54
End: 2024-10-03
Payer: COMMERCIAL

## 2024-10-03 PROCEDURE — 90837 PSYTX W PT 60 MINUTES: CPT | Mod: 95

## 2024-10-04 NOTE — PLAN
[FreeTextEntry2] : Goal: Pt will reduce overall level, frequency and intensity of anxiety so that daily functioning is not impaired\par  Objective: Pt will replace negative and self-defeating self-talk with verbalization of realistic and positive cognitive messages.\par  Intervention: Clinician will assist pt to practice using healthy communication techniques in order to communicate her feelings and needs in order to help avoid isolating when experiencing depression or anxiety\par  Intervention: Clinician will reinforce positive, reality based cognitive messages that enhance self-confidence and increase adaptive action\par  Intervention: Clinician will provide education regarding healthy communication techniques and assist client in practicing these in sessions. \par  \par  \par   [Acceptance and Commitment Therapy] : Acceptance and Commitment Therapy  [Cognitive and/or Behavior Therapy] : Cognitive and/or Behavior Therapy  [Eye Movement Desensitization and Reprocessing Therapy (EMDR)] : Eye Movement Desensitization and Reprocessing Therapy (EMDR) [Psychodynamic Therapy] : Psychodynamic Therapy  [Psychoeducation] : Psychoeducation  [Skills training (all types)] : Skills training (all types)  [Supportive Therapy] : Supportive Therapy [de-identified] : Patient presented as oriented times three and with stable mood. No Si or HI reported. Patient is able to see positive results from parenting and communication, and integrating greater confidence as family dynamics has subtle changes. Therapy focus on same and absorbing this. ACT utilized to foster same, and integrating positive self talk. Patient responded well to same. Supportive therapy provided around recent changes with having to move as single mother and with youngest son. Session was virtual.   [FreeTextEntry1] : Patient is to continue to attend weekly psychotherapy sessions to focus in on chief complaints until goals/adaptive desired functioning is reached.

## 2024-10-10 ENCOUNTER — APPOINTMENT (OUTPATIENT)
Dept: PSYCHIATRY | Facility: CLINIC | Age: 54
End: 2024-10-10
Payer: COMMERCIAL

## 2024-10-10 PROCEDURE — 90837 PSYTX W PT 60 MINUTES: CPT | Mod: 95

## 2024-10-17 ENCOUNTER — APPOINTMENT (OUTPATIENT)
Dept: PSYCHIATRY | Facility: CLINIC | Age: 54
End: 2024-10-17
Payer: COMMERCIAL

## 2024-10-17 PROCEDURE — 90834 PSYTX W PT 45 MINUTES: CPT | Mod: 95

## 2024-10-24 ENCOUNTER — APPOINTMENT (OUTPATIENT)
Dept: PSYCHIATRY | Facility: CLINIC | Age: 54
End: 2024-10-24
Payer: COMMERCIAL

## 2024-10-24 PROCEDURE — 90837 PSYTX W PT 60 MINUTES: CPT | Mod: 95

## 2024-10-31 ENCOUNTER — APPOINTMENT (OUTPATIENT)
Dept: PSYCHIATRY | Facility: CLINIC | Age: 54
End: 2024-10-31
Payer: COMMERCIAL

## 2024-10-31 PROCEDURE — 90837 PSYTX W PT 60 MINUTES: CPT | Mod: 95

## 2024-11-07 ENCOUNTER — APPOINTMENT (OUTPATIENT)
Dept: PSYCHIATRY | Facility: CLINIC | Age: 54
End: 2024-11-07
Payer: COMMERCIAL

## 2024-11-07 PROCEDURE — 90837 PSYTX W PT 60 MINUTES: CPT | Mod: 95

## 2024-11-14 ENCOUNTER — APPOINTMENT (OUTPATIENT)
Dept: PSYCHIATRY | Facility: CLINIC | Age: 54
End: 2024-11-14
Payer: COMMERCIAL

## 2024-11-14 PROCEDURE — 90837 PSYTX W PT 60 MINUTES: CPT | Mod: 95

## 2024-11-20 ENCOUNTER — APPOINTMENT (OUTPATIENT)
Dept: DERMATOLOGY | Facility: CLINIC | Age: 54
End: 2024-11-20
Payer: COMMERCIAL

## 2024-11-20 DIAGNOSIS — Z85.828 PERSONAL HISTORY OF OTHER MALIGNANT NEOPLASM OF SKIN: ICD-10-CM

## 2024-11-20 DIAGNOSIS — L82.1 OTHER SEBORRHEIC KERATOSIS: ICD-10-CM

## 2024-11-20 DIAGNOSIS — L81.4 OTHER MELANIN HYPERPIGMENTATION: ICD-10-CM

## 2024-11-20 DIAGNOSIS — D22.9 MELANOCYTIC NEVI, UNSPECIFIED: ICD-10-CM

## 2024-11-20 DIAGNOSIS — Z12.83 ENCOUNTER FOR SCREENING FOR MALIGNANT NEOPLASM OF SKIN: ICD-10-CM

## 2024-11-20 PROCEDURE — 99213 OFFICE O/P EST LOW 20 MIN: CPT

## 2024-11-21 ENCOUNTER — APPOINTMENT (OUTPATIENT)
Dept: PSYCHIATRY | Facility: CLINIC | Age: 54
End: 2024-11-21
Payer: COMMERCIAL

## 2024-11-21 PROCEDURE — 90834 PSYTX W PT 45 MINUTES: CPT | Mod: 95

## 2024-12-05 ENCOUNTER — APPOINTMENT (OUTPATIENT)
Dept: PSYCHIATRY | Facility: CLINIC | Age: 54
End: 2024-12-05
Payer: COMMERCIAL

## 2024-12-05 PROCEDURE — 90837 PSYTX W PT 60 MINUTES: CPT | Mod: 95

## 2024-12-12 ENCOUNTER — APPOINTMENT (OUTPATIENT)
Dept: PSYCHIATRY | Facility: CLINIC | Age: 54
End: 2024-12-12
Payer: COMMERCIAL

## 2024-12-12 PROCEDURE — 90837 PSYTX W PT 60 MINUTES: CPT | Mod: 95

## 2024-12-19 ENCOUNTER — APPOINTMENT (OUTPATIENT)
Dept: PSYCHIATRY | Facility: CLINIC | Age: 54
End: 2024-12-19
Payer: COMMERCIAL

## 2024-12-19 PROCEDURE — 90837 PSYTX W PT 60 MINUTES: CPT | Mod: 95

## 2024-12-23 NOTE — PHYSICAL EXAM
n/a [Cooperative] : cooperative [Euthymic] : euthymic [Full] : full [Clear] : clear [Linear/Goal Directed] : linear/goal directed [WNL] : within normal limits

## 2024-12-26 ENCOUNTER — APPOINTMENT (OUTPATIENT)
Dept: PSYCHIATRY | Facility: CLINIC | Age: 54
End: 2024-12-26
Payer: COMMERCIAL

## 2024-12-26 PROCEDURE — 90837 PSYTX W PT 60 MINUTES: CPT | Mod: 95

## 2025-01-02 ENCOUNTER — APPOINTMENT (OUTPATIENT)
Dept: PSYCHIATRY | Facility: CLINIC | Age: 55
End: 2025-01-02
Payer: COMMERCIAL

## 2025-01-02 PROCEDURE — 90837 PSYTX W PT 60 MINUTES: CPT | Mod: 95

## 2025-01-16 ENCOUNTER — APPOINTMENT (OUTPATIENT)
Dept: PSYCHIATRY | Facility: CLINIC | Age: 55
End: 2025-01-16
Payer: COMMERCIAL

## 2025-01-16 PROCEDURE — 90837 PSYTX W PT 60 MINUTES: CPT | Mod: 95

## 2025-01-22 NOTE — PLAN
[FreeTextEntry2] : Goal: Pt will reduce overall level, frequency and intensity of anxiety so that daily functioning is not impaired\par  Objective: Pt will replace negative and self-defeating self-talk with verbalization of realistic and positive cognitive messages.\par  Intervention: Clinician will assist pt to practice using healthy communication techniques in order to communicate her feelings and needs in order to help avoid isolating when experiencing depression or anxiety\par  Intervention: Clinician will reinforce positive, reality based cognitive messages that enhance self-confidence and increase adaptive action\par  Intervention: Clinician will provide education regarding healthy communication techniques and assist client in practicing these in sessions. \par  \par  \par   [Acceptance and Commitment Therapy] : Acceptance and Commitment Therapy  [Cognitive and/or Behavior Therapy] : Cognitive and/or Behavior Therapy  [Eye Movement Desensitization and Reprocessing Therapy (EMDR)] : Eye Movement Desensitization and Reprocessing Therapy (EMDR) [Psychodynamic Therapy] : Psychodynamic Therapy  [Psychoeducation] : Psychoeducation  [Skills training (all types)] : Skills training (all types)  [Supportive Therapy] : Supportive Therapy [de-identified] : Patient presented as oriented times three and with stable mood. No Si or HI reported. Patient shared good news for self, clinician had client abosrb positive news, see power of letting go and being in here and now. Supportive therapy provided with emotional support and validation. Client making process with cognitive re-framing and re-building confidence. Further stabilization as needed..  Session was virtual today [FreeTextEntry1] : Patient is to continue to attend weekly psychotherapy sessions to focus in on chief complaints until goals/adaptive desired functioning is reached.  PAST SURGICAL HISTORY:  H/O arthroscopy of right knee     H/O varicose vein ligation

## 2025-01-23 ENCOUNTER — APPOINTMENT (OUTPATIENT)
Dept: PSYCHIATRY | Facility: CLINIC | Age: 55
End: 2025-01-23
Payer: COMMERCIAL

## 2025-01-23 PROCEDURE — 90834 PSYTX W PT 45 MINUTES: CPT | Mod: 95

## 2025-02-06 ENCOUNTER — APPOINTMENT (OUTPATIENT)
Dept: PSYCHIATRY | Facility: CLINIC | Age: 55
End: 2025-02-06
Payer: COMMERCIAL

## 2025-02-06 ENCOUNTER — APPOINTMENT (OUTPATIENT)
Dept: PSYCHIATRY | Facility: CLINIC | Age: 55
End: 2025-02-06

## 2025-02-06 PROCEDURE — 90837 PSYTX W PT 60 MINUTES: CPT | Mod: 95

## 2025-02-13 ENCOUNTER — APPOINTMENT (OUTPATIENT)
Dept: PSYCHIATRY | Facility: CLINIC | Age: 55
End: 2025-02-13
Payer: COMMERCIAL

## 2025-02-13 PROCEDURE — 90834 PSYTX W PT 45 MINUTES: CPT | Mod: 95

## 2025-02-27 ENCOUNTER — APPOINTMENT (OUTPATIENT)
Dept: PSYCHIATRY | Facility: CLINIC | Age: 55
End: 2025-02-27
Payer: COMMERCIAL

## 2025-02-27 PROCEDURE — 90834 PSYTX W PT 45 MINUTES: CPT | Mod: 95

## 2025-03-13 ENCOUNTER — APPOINTMENT (OUTPATIENT)
Facility: CLINIC | Age: 55
End: 2025-03-13

## 2025-03-13 PROCEDURE — 90834 PSYTX W PT 45 MINUTES: CPT | Mod: 95

## 2025-03-20 ENCOUNTER — APPOINTMENT (OUTPATIENT)
Facility: CLINIC | Age: 55
End: 2025-03-20
Payer: COMMERCIAL

## 2025-03-20 PROCEDURE — 90834 PSYTX W PT 45 MINUTES: CPT | Mod: 95

## 2025-03-27 ENCOUNTER — APPOINTMENT (OUTPATIENT)
Facility: CLINIC | Age: 55
End: 2025-03-27

## 2025-03-27 PROCEDURE — 90834 PSYTX W PT 45 MINUTES: CPT | Mod: 95

## 2025-04-03 ENCOUNTER — APPOINTMENT (OUTPATIENT)
Facility: CLINIC | Age: 55
End: 2025-04-03

## 2025-04-03 PROCEDURE — 90834 PSYTX W PT 45 MINUTES: CPT | Mod: 95

## 2025-04-10 ENCOUNTER — APPOINTMENT (OUTPATIENT)
Facility: CLINIC | Age: 55
End: 2025-04-10
Payer: COMMERCIAL

## 2025-04-10 PROCEDURE — 90834 PSYTX W PT 45 MINUTES: CPT | Mod: 95

## 2025-04-17 ENCOUNTER — APPOINTMENT (OUTPATIENT)
Facility: CLINIC | Age: 55
End: 2025-04-17
Payer: COMMERCIAL

## 2025-04-17 PROCEDURE — 90834 PSYTX W PT 45 MINUTES: CPT | Mod: 95

## 2025-04-24 ENCOUNTER — APPOINTMENT (OUTPATIENT)
Facility: CLINIC | Age: 55
End: 2025-04-24
Payer: COMMERCIAL

## 2025-04-24 PROCEDURE — 90834 PSYTX W PT 45 MINUTES: CPT | Mod: 95

## 2025-05-01 ENCOUNTER — APPOINTMENT (OUTPATIENT)
Facility: CLINIC | Age: 55
End: 2025-05-01
Payer: COMMERCIAL

## 2025-05-01 PROCEDURE — 90832 PSYTX W PT 30 MINUTES: CPT | Mod: 95

## 2025-05-08 ENCOUNTER — APPOINTMENT (OUTPATIENT)
Facility: CLINIC | Age: 55
End: 2025-05-08
Payer: COMMERCIAL

## 2025-05-08 PROCEDURE — 90834 PSYTX W PT 45 MINUTES: CPT | Mod: 95

## 2025-05-29 ENCOUNTER — APPOINTMENT (OUTPATIENT)
Facility: CLINIC | Age: 55
End: 2025-05-29
Payer: COMMERCIAL

## 2025-05-29 PROCEDURE — 90834 PSYTX W PT 45 MINUTES: CPT | Mod: 95

## 2025-06-05 ENCOUNTER — APPOINTMENT (OUTPATIENT)
Facility: CLINIC | Age: 55
End: 2025-06-05
Payer: COMMERCIAL

## 2025-06-05 PROCEDURE — 90837 PSYTX W PT 60 MINUTES: CPT | Mod: 95

## 2025-06-12 ENCOUNTER — APPOINTMENT (OUTPATIENT)
Facility: CLINIC | Age: 55
End: 2025-06-12
Payer: COMMERCIAL

## 2025-06-12 PROCEDURE — 90834 PSYTX W PT 45 MINUTES: CPT | Mod: 95

## 2025-06-19 ENCOUNTER — APPOINTMENT (OUTPATIENT)
Facility: CLINIC | Age: 55
End: 2025-06-19
Payer: COMMERCIAL

## 2025-06-19 PROCEDURE — 90837 PSYTX W PT 60 MINUTES: CPT | Mod: 95

## 2025-06-26 ENCOUNTER — APPOINTMENT (OUTPATIENT)
Facility: CLINIC | Age: 55
End: 2025-06-26
Payer: COMMERCIAL

## 2025-06-26 PROCEDURE — 90834 PSYTX W PT 45 MINUTES: CPT | Mod: 95

## 2025-07-03 ENCOUNTER — APPOINTMENT (OUTPATIENT)
Facility: CLINIC | Age: 55
End: 2025-07-03
Payer: COMMERCIAL

## 2025-07-03 PROCEDURE — 90834 PSYTX W PT 45 MINUTES: CPT | Mod: 95

## 2025-07-10 ENCOUNTER — APPOINTMENT (OUTPATIENT)
Facility: CLINIC | Age: 55
End: 2025-07-10
Payer: COMMERCIAL

## 2025-07-10 PROCEDURE — 90834 PSYTX W PT 45 MINUTES: CPT | Mod: 95

## 2025-07-10 NOTE — ASU PREOP CHECKLIST - HEIGHT IN CM
pleasant, well nourished, well developed, in no acute distress , normal communication ability 157.48

## 2025-07-15 ENCOUNTER — NON-APPOINTMENT (OUTPATIENT)
Age: 55
End: 2025-07-15

## 2025-07-16 ENCOUNTER — NON-APPOINTMENT (OUTPATIENT)
Age: 55
End: 2025-07-16

## 2025-07-16 ENCOUNTER — APPOINTMENT (OUTPATIENT)
Dept: GASTROENTEROLOGY | Facility: CLINIC | Age: 55
End: 2025-07-16
Payer: COMMERCIAL

## 2025-07-16 VITALS
HEART RATE: 84 BPM | HEIGHT: 62 IN | BODY MASS INDEX: 24.84 KG/M2 | WEIGHT: 135 LBS | SYSTOLIC BLOOD PRESSURE: 107 MMHG | DIASTOLIC BLOOD PRESSURE: 75 MMHG

## 2025-07-16 PROBLEM — Z12.11 COLON CANCER SCREENING: Status: ACTIVE | Noted: 2025-07-16

## 2025-07-16 PROBLEM — R19.7 WATERY DIARRHEA: Status: ACTIVE | Noted: 2025-07-16

## 2025-07-16 PROCEDURE — 99204 OFFICE O/P NEW MOD 45 MIN: CPT

## 2025-07-16 RX ORDER — SODIUM SULFATE, POTASSIUM SULFATE AND MAGNESIUM SULFATE 1.6; 3.13; 17.5 G/177ML; G/177ML; G/177ML
17.5-3.13-1.6 SOLUTION ORAL
Qty: 2 | Refills: 0 | Status: ACTIVE | COMMUNITY
Start: 2025-07-16 | End: 1900-01-01

## 2025-07-17 LAB
IGA SERPL-MCNC: 205 MG/DL
TTG IGA SER IA-ACNC: <0.5 U/ML
TTG IGA SER-ACNC: NEGATIVE

## 2025-07-24 NOTE — RISK ASSESSMENT
[No, patient denies ideation or behavior] : No, patient denies ideation or behavior [No] : No [FreeTextEntry8] : Denies [FreeTextEntry7] : denies [Negative] : Genitourinary

## 2025-07-31 ENCOUNTER — APPOINTMENT (OUTPATIENT)
Facility: CLINIC | Age: 55
End: 2025-07-31

## 2025-07-31 PROCEDURE — 90834 PSYTX W PT 45 MINUTES: CPT | Mod: 95

## 2025-08-01 ENCOUNTER — APPOINTMENT (OUTPATIENT)
Dept: GASTROENTEROLOGY | Facility: HOSPITAL | Age: 55
End: 2025-08-01

## 2025-08-01 ENCOUNTER — OUTPATIENT (OUTPATIENT)
Dept: OUTPATIENT SERVICES | Facility: HOSPITAL | Age: 55
LOS: 1 days | End: 2025-08-01
Payer: COMMERCIAL

## 2025-08-01 ENCOUNTER — RESULT REVIEW (OUTPATIENT)
Age: 55
End: 2025-08-01

## 2025-08-01 ENCOUNTER — TRANSCRIPTION ENCOUNTER (OUTPATIENT)
Age: 55
End: 2025-08-01

## 2025-08-01 VITALS
WEIGHT: 130.07 LBS | TEMPERATURE: 98 F | OXYGEN SATURATION: 97 % | SYSTOLIC BLOOD PRESSURE: 112 MMHG | RESPIRATION RATE: 18 BRPM | HEIGHT: 62 IN | HEART RATE: 57 BPM | DIASTOLIC BLOOD PRESSURE: 66 MMHG

## 2025-08-01 VITALS
OXYGEN SATURATION: 100 % | RESPIRATION RATE: 20 BRPM | SYSTOLIC BLOOD PRESSURE: 121 MMHG | DIASTOLIC BLOOD PRESSURE: 74 MMHG | HEART RATE: 70 BPM

## 2025-08-01 DIAGNOSIS — Z90.411 ACQUIRED PARTIAL ABSENCE OF PANCREAS: Chronic | ICD-10-CM

## 2025-08-01 DIAGNOSIS — Z12.11 ENCOUNTER FOR SCREENING FOR MALIGNANT NEOPLASM OF COLON: ICD-10-CM

## 2025-08-01 PROCEDURE — 88305 TISSUE EXAM BY PATHOLOGIST: CPT | Mod: 26

## 2025-08-01 PROCEDURE — 45380 COLONOSCOPY AND BIOPSY: CPT | Mod: PT

## 2025-08-01 PROCEDURE — 88305 TISSUE EXAM BY PATHOLOGIST: CPT

## 2025-08-01 PROCEDURE — 45380 COLONOSCOPY AND BIOPSY: CPT

## 2025-08-01 DEVICE — NET RETRV ROT ROTH 2.5MMX230CM: Type: IMPLANTABLE DEVICE | Status: FUNCTIONAL

## 2025-08-01 RX ADMIN — Medication 30 MILLILITER(S): at 10:27

## 2025-08-05 LAB — SURGICAL PATHOLOGY STUDY: SIGNIFICANT CHANGE UP

## 2025-08-07 ENCOUNTER — APPOINTMENT (OUTPATIENT)
Facility: CLINIC | Age: 55
End: 2025-08-07

## 2025-08-21 ENCOUNTER — APPOINTMENT (OUTPATIENT)
Facility: CLINIC | Age: 55
End: 2025-08-21
Payer: COMMERCIAL

## 2025-08-21 PROCEDURE — 90834 PSYTX W PT 45 MINUTES: CPT | Mod: 95

## 2025-08-26 ENCOUNTER — APPOINTMENT (OUTPATIENT)
Facility: CLINIC | Age: 55
End: 2025-08-26

## 2025-08-26 PROCEDURE — 90834 PSYTX W PT 45 MINUTES: CPT

## 2025-08-28 ENCOUNTER — APPOINTMENT (OUTPATIENT)
Facility: CLINIC | Age: 55
End: 2025-08-28

## 2025-09-04 ENCOUNTER — APPOINTMENT (OUTPATIENT)
Facility: CLINIC | Age: 55
End: 2025-09-04
Payer: COMMERCIAL

## 2025-09-04 PROCEDURE — 90837 PSYTX W PT 60 MINUTES: CPT | Mod: 95

## 2025-09-11 ENCOUNTER — APPOINTMENT (OUTPATIENT)
Facility: CLINIC | Age: 55
End: 2025-09-11
Payer: COMMERCIAL

## 2025-09-11 PROCEDURE — 90837 PSYTX W PT 60 MINUTES: CPT | Mod: 95

## 2025-09-18 ENCOUNTER — APPOINTMENT (OUTPATIENT)
Facility: CLINIC | Age: 55
End: 2025-09-18
Payer: COMMERCIAL

## 2025-09-18 PROCEDURE — 90834 PSYTX W PT 45 MINUTES: CPT | Mod: 95

## (undated) DEVICE — CATH IV SAFE BC 22G X 1" (BLUE)

## (undated) DEVICE — FLUENT FMS PROCEDURE KIT

## (undated) DEVICE — FOLEY HOLDER STATLOCK 2 WAY ADULT

## (undated) DEVICE — DRAPE LIGHT HANDLE COVER (GREEN)

## (undated) DEVICE — SUCTION YANKAUER NO CONTROL VENT

## (undated) DEVICE — FORCEP RADIAL JAW 4 JUMBO 2.8MM 3.2MM 240CM ORANGE DISP

## (undated) DEVICE — POSITIONER FOAM EGG CRATE ULNAR 2PCS (PINK)

## (undated) DEVICE — SPECIMEN CONTAINER 100ML

## (undated) DEVICE — SENSOR O2 FINGER ADULT

## (undated) DEVICE — SOL IRR POUR NS 0.9% 500ML

## (undated) DEVICE — DRSG TELFA 3 X 8

## (undated) DEVICE — MYOSURE SCOPE SEAL

## (undated) DEVICE — TUBING IV SET GRAVITY 3Y 100" MACRO

## (undated) DEVICE — SOL INJ NS 0.9% 500ML 2 PORT

## (undated) DEVICE — POLY TRAP ETRAP

## (undated) DEVICE — IRRIGATOR BIO SHIELD

## (undated) DEVICE — CLAMP BX HOT RAD JAW 3

## (undated) DEVICE — CATH IV SAFE BC 20G X 1.16" (PINK)

## (undated) DEVICE — WARMING BLANKET UPPER ADULT

## (undated) DEVICE — BRUSH COLONOSCOPY CYTOLOGY

## (undated) DEVICE — PACK IV START WITH CHG

## (undated) DEVICE — ELCTR GROUNDING PAD ADULT COVIDIEN

## (undated) DEVICE — TUBING SUCTION CONN 6FT STERILE

## (undated) DEVICE — PACK LITHOTOMY

## (undated) DEVICE — DRAPE TOWEL BLUE STICKY

## (undated) DEVICE — TUBING SUCTION 20FT

## (undated) DEVICE — BIOPSY FORCEP RADIAL JAW 4 STANDARD WITH NEEDLE

## (undated) DEVICE — ELCTR HF RESECTION LOOP ANGLED 22.5FR

## (undated) DEVICE — SYR LUER LOK 50CC